# Patient Record
Sex: FEMALE | Race: OTHER | HISPANIC OR LATINO | URBAN - METROPOLITAN AREA
[De-identification: names, ages, dates, MRNs, and addresses within clinical notes are randomized per-mention and may not be internally consistent; named-entity substitution may affect disease eponyms.]

---

## 2024-01-21 ENCOUNTER — OUTPATIENT (OUTPATIENT)
Dept: OUTPATIENT SERVICES | Facility: HOSPITAL | Age: 34
LOS: 1 days | End: 2024-01-21
Payer: COMMERCIAL

## 2024-01-21 VITALS — DIASTOLIC BLOOD PRESSURE: 78 MMHG | SYSTOLIC BLOOD PRESSURE: 139 MMHG | OXYGEN SATURATION: 100 % | HEART RATE: 60 BPM

## 2024-01-21 VITALS — OXYGEN SATURATION: 100 % | HEART RATE: 59 BPM

## 2024-01-21 DIAGNOSIS — O26.899 OTHER SPECIFIED PREGNANCY RELATED CONDITIONS, UNSPECIFIED TRIMESTER: ICD-10-CM

## 2024-01-21 LAB
ALBUMIN SERPL ELPH-MCNC: 3.2 G/DL — LOW (ref 3.3–5)
ALP SERPL-CCNC: 170 U/L — HIGH (ref 40–120)
ALT FLD-CCNC: 12 U/L — SIGNIFICANT CHANGE UP (ref 10–45)
ANION GAP SERPL CALC-SCNC: 12 MMOL/L — SIGNIFICANT CHANGE UP (ref 5–17)
APPEARANCE UR: ABNORMAL
APTT BLD: 25.1 SEC — SIGNIFICANT CHANGE UP (ref 24.5–35.6)
AST SERPL-CCNC: 17 U/L — SIGNIFICANT CHANGE UP (ref 10–40)
BACTERIA # UR AUTO: ABNORMAL /HPF
BASOPHILS # BLD AUTO: 0.02 K/UL — SIGNIFICANT CHANGE UP (ref 0–0.2)
BASOPHILS NFR BLD AUTO: 0.3 % — SIGNIFICANT CHANGE UP (ref 0–2)
BILIRUB SERPL-MCNC: 0.2 MG/DL — SIGNIFICANT CHANGE UP (ref 0.2–1.2)
BILIRUB UR-MCNC: NEGATIVE — SIGNIFICANT CHANGE UP
BLD GP AB SCN SERPL QL: NEGATIVE — SIGNIFICANT CHANGE UP
BUN SERPL-MCNC: 10 MG/DL — SIGNIFICANT CHANGE UP (ref 7–23)
CALCIUM SERPL-MCNC: 9.1 MG/DL — SIGNIFICANT CHANGE UP (ref 8.4–10.5)
CAST: 2 /LPF — SIGNIFICANT CHANGE UP (ref 0–4)
CHLORIDE SERPL-SCNC: 106 MMOL/L — SIGNIFICANT CHANGE UP (ref 96–108)
CO2 SERPL-SCNC: 20 MMOL/L — LOW (ref 22–31)
COLOR SPEC: YELLOW — SIGNIFICANT CHANGE UP
CREAT ?TM UR-MCNC: 112 MG/DL — SIGNIFICANT CHANGE UP
CREAT SERPL-MCNC: 0.55 MG/DL — SIGNIFICANT CHANGE UP (ref 0.5–1.3)
DIFF PNL FLD: NEGATIVE — SIGNIFICANT CHANGE UP
EGFR: 123 ML/MIN/1.73M2 — SIGNIFICANT CHANGE UP
EOSINOPHIL # BLD AUTO: 0.1 K/UL — SIGNIFICANT CHANGE UP (ref 0–0.5)
EOSINOPHIL NFR BLD AUTO: 1.4 % — SIGNIFICANT CHANGE UP (ref 0–6)
FIBRINOGEN PPP-MCNC: 796 MG/DL — HIGH (ref 200–445)
GLUCOSE SERPL-MCNC: 80 MG/DL — SIGNIFICANT CHANGE UP (ref 70–99)
GLUCOSE UR QL: NEGATIVE MG/DL — SIGNIFICANT CHANGE UP
HCT VFR BLD CALC: 34 % — LOW (ref 34.5–45)
HGB BLD-MCNC: 11 G/DL — LOW (ref 11.5–15.5)
IMM GRANULOCYTES NFR BLD AUTO: 0.4 % — SIGNIFICANT CHANGE UP (ref 0–0.9)
INR BLD: 0.96 RATIO — SIGNIFICANT CHANGE UP (ref 0.85–1.18)
KETONES UR-MCNC: NEGATIVE MG/DL — SIGNIFICANT CHANGE UP
LDH SERPL L TO P-CCNC: 132 U/L — SIGNIFICANT CHANGE UP (ref 50–242)
LEUKOCYTE ESTERASE UR-ACNC: ABNORMAL
LYMPHOCYTES # BLD AUTO: 1.84 K/UL — SIGNIFICANT CHANGE UP (ref 1–3.3)
LYMPHOCYTES # BLD AUTO: 25.7 % — SIGNIFICANT CHANGE UP (ref 13–44)
MCHC RBC-ENTMCNC: 28.4 PG — SIGNIFICANT CHANGE UP (ref 27–34)
MCHC RBC-ENTMCNC: 32.4 GM/DL — SIGNIFICANT CHANGE UP (ref 32–36)
MCV RBC AUTO: 87.6 FL — SIGNIFICANT CHANGE UP (ref 80–100)
MONOCYTES # BLD AUTO: 0.35 K/UL — SIGNIFICANT CHANGE UP (ref 0–0.9)
MONOCYTES NFR BLD AUTO: 4.9 % — SIGNIFICANT CHANGE UP (ref 2–14)
NEUTROPHILS # BLD AUTO: 4.82 K/UL — SIGNIFICANT CHANGE UP (ref 1.8–7.4)
NEUTROPHILS NFR BLD AUTO: 67.3 % — SIGNIFICANT CHANGE UP (ref 43–77)
NITRITE UR-MCNC: NEGATIVE — SIGNIFICANT CHANGE UP
NRBC # BLD: 0 /100 WBCS — SIGNIFICANT CHANGE UP (ref 0–0)
PH UR: 6.5 — SIGNIFICANT CHANGE UP (ref 5–8)
PLATELET # BLD AUTO: 182 K/UL — SIGNIFICANT CHANGE UP (ref 150–400)
POTASSIUM SERPL-MCNC: 3.8 MMOL/L — SIGNIFICANT CHANGE UP (ref 3.5–5.3)
POTASSIUM SERPL-SCNC: 3.8 MMOL/L — SIGNIFICANT CHANGE UP (ref 3.5–5.3)
PROT ?TM UR-MCNC: 60 MG/DL — HIGH (ref 0–12)
PROT SERPL-MCNC: 6.5 G/DL — SIGNIFICANT CHANGE UP (ref 6–8.3)
PROT UR-MCNC: 100 MG/DL
PROT/CREAT UR-RTO: 0.5 RATIO — HIGH (ref 0–0.2)
PROTHROM AB SERPL-ACNC: 10.1 SEC — SIGNIFICANT CHANGE UP (ref 9.5–13)
RBC # BLD: 3.88 M/UL — SIGNIFICANT CHANGE UP (ref 3.8–5.2)
RBC # FLD: 15.3 % — HIGH (ref 10.3–14.5)
RBC CASTS # UR COMP ASSIST: 3 /HPF — SIGNIFICANT CHANGE UP (ref 0–4)
REVIEW: SIGNIFICANT CHANGE UP
RH IG SCN BLD-IMP: POSITIVE — SIGNIFICANT CHANGE UP
SODIUM SERPL-SCNC: 138 MMOL/L — SIGNIFICANT CHANGE UP (ref 135–145)
SP GR SPEC: 1.02 — SIGNIFICANT CHANGE UP (ref 1–1.03)
SQUAMOUS # UR AUTO: 6 /HPF — HIGH (ref 0–5)
T PALLIDUM AB TITR SER: NEGATIVE — SIGNIFICANT CHANGE UP
URATE SERPL-MCNC: 4.7 MG/DL — SIGNIFICANT CHANGE UP (ref 2.5–7)
UROBILINOGEN FLD QL: 0.2 MG/DL — SIGNIFICANT CHANGE UP (ref 0.2–1)
WBC # BLD: 7.16 K/UL — SIGNIFICANT CHANGE UP (ref 3.8–10.5)
WBC # FLD AUTO: 7.16 K/UL — SIGNIFICANT CHANGE UP (ref 3.8–10.5)
WBC UR QL: 5 /HPF — SIGNIFICANT CHANGE UP (ref 0–5)

## 2024-01-21 PROCEDURE — 85610 PROTHROMBIN TIME: CPT

## 2024-01-21 PROCEDURE — 85730 THROMBOPLASTIN TIME PARTIAL: CPT

## 2024-01-21 PROCEDURE — G0463: CPT

## 2024-01-21 PROCEDURE — 85025 COMPLETE CBC W/AUTO DIFF WBC: CPT

## 2024-01-21 PROCEDURE — 83986 ASSAY PH BODY FLUID NOS: CPT

## 2024-01-21 PROCEDURE — 83615 LACTATE (LD) (LDH) ENZYME: CPT

## 2024-01-21 PROCEDURE — 80053 COMPREHEN METABOLIC PANEL: CPT

## 2024-01-21 PROCEDURE — 84550 ASSAY OF BLOOD/URIC ACID: CPT

## 2024-01-21 PROCEDURE — 86762 RUBELLA ANTIBODY: CPT

## 2024-01-21 PROCEDURE — 86901 BLOOD TYPING SEROLOGIC RH(D): CPT

## 2024-01-21 PROCEDURE — 81001 URINALYSIS AUTO W/SCOPE: CPT

## 2024-01-21 PROCEDURE — 84156 ASSAY OF PROTEIN URINE: CPT

## 2024-01-21 PROCEDURE — 86780 TREPONEMA PALLIDUM: CPT

## 2024-01-21 PROCEDURE — 85384 FIBRINOGEN ACTIVITY: CPT

## 2024-01-21 PROCEDURE — 82570 ASSAY OF URINE CREATININE: CPT

## 2024-01-21 PROCEDURE — 86900 BLOOD TYPING SEROLOGIC ABO: CPT

## 2024-01-21 PROCEDURE — 59025 FETAL NON-STRESS TEST: CPT

## 2024-01-21 PROCEDURE — 36415 COLL VENOUS BLD VENIPUNCTURE: CPT

## 2024-01-21 PROCEDURE — 86850 RBC ANTIBODY SCREEN: CPT

## 2024-01-21 NOTE — OB PROVIDER TRIAGE NOTE - NSOBPROVIDERNOTE_OBGYN_ALL_OB_FT
Assessment  34yo  at 40w1d presents for leakage of fluid and contractions. Patient found to be intact (membranes not ruptured) and not in labor. Fetal status is reassuring on NST and BPP. However, blood pressures mild range upon presentation. Patient's BP cuff was noted to be around her elbow and was readjusted at approximately 2:55 am. Since then, BPs have been normotensive.  Patient has appointment with her OB tomorrow, . Plan is for IOL if she does not go into labor by 41w.    Plan  - Elevated/labile BPs, possibly related to BP cuff misplacement, but will monitor BPs and draw HELLP labs  - Monitor [place]/NST while in triage    D/w attending Dr. Rogers Jung, PGY2 Assessment  34yo  at 40w1d presents for leakage of fluid and contractions. Patient found to be intact (membranes not ruptured) and not in labor. Fetal status is reassuring on NST and BPP. However, patient with 2 mild-range blood pressures upon presentation. Patient's BP cuff was noted to be around her elbow and was readjusted at approximately 2:55 am. Since then, BPs have been normotensive.  Patient has appointment with her OB tomorrow, . Plan is for IOL if she does not go into labor by 41w.    Plan  - Two mild-range BPs, resolved with BP cuff replacement, will monitor BPs x2 hr and f/u HELLP labs  - CFM/[place]    D/w attending Dr. Rogers Jung, PGY2 Assessment  32yo  at 40w1d presents for leakage of fluid and contractions. Patient found to be intact (membranes not ruptured) and not in labor. Fetal status is reassuring on NST and BPP. However, patient with 2 mild-range blood pressures upon presentation. Patient's BP cuff was noted to be around her elbow and was readjusted at approximately 2:55 am. Since then, BPs have been normotensive.  Patient has appointment with her OB tomorrow, . Plan is for IOL if she does not go into labor by 41w.    Plan  - Two mild-range BPs, resolved with BP cuff replacement, will monitor BPs x2 hr and f/u HELLP labs  - CFM/[place]    D/w attending Dr. Rogers Jung, PGY2      Addendum  @6a  HELLP labs within normal limits (P/C pending). All BPs normotensive since BP cuff readjusted. FHT reactive. [place] with irregular contractions. Patient seen at bedside and reports she feels mild tightening but she can sleep through it - not bothersome. Patient counseled on lab and vital sign results - no diagnosis of hypertensive disorder made. She is cleared for discharge with instructions for close follow-up. Patient to keep appointment for  with OB. Strict return precautions given for severe headahce, RUQ pain, SOB, severe nausea/vomiting. Return precautions for vaginal bleeding, loss of fluid, dec FM, painful/regular CTX. Patient confirms understanding.  - NST reviewed by Buddy PGY-3  Plan d/w Dr. Rogers Chung PGY2 Assessment  32yo  at 40w1d presents for leakage of fluid and contractions. Patient found to be intact (membranes not ruptured) and not in labor. Fetal status is reassuring on NST and BPP. However, patient with 2 mild-range blood pressures upon presentation. Patient's BP cuff was noted to be around her elbow and was readjusted at approximately 2:55 am. Since then, BPs have been normotensive.  Patient has appointment with her OB tomorrow, . Plan is for IOL if she does not go into labor by 41w.    Plan  - Two mild-range BPs, resolved with BP cuff replacement, will monitor BPs x2 hr and f/u HELLP labs  - CFM/[place]    D/w attending Dr. Rogers Jung, PGY2      Addendum  @6a  HELLP labs within normal limits (P/C pending). All BPs normotensive since BP cuff readjusted. FHT reactive. [place] with irregular contractions. Patient seen at bedside and reports she feels mild tightening but she can sleep through it - not bothersome. Patient counseled on lab and vital sign results - no diagnosis of hypertensive disorder made. She is cleared for discharge with instructions for close follow-up. Patient to keep appointment for  with OB. Strict return precautions given for severe headahce, RUQ pain, SOB, severe nausea/vomiting. Return precautions for vaginal bleeding, loss of fluid, dec FM, painful/regular CTX. Patient confirms understanding.  NST reviewed by Buddy PGY-3  Plan d/w Dr. Rogers Chung PGY2 Assessment  32yo  at 40w1d presents for leakage of fluid and contractions. Patient found to be intact (membranes not ruptured) and not in labor. Fetal status is reassuring on NST and BPP. However, patient with 2 mild-range blood pressures upon presentation. Patient's BP cuff was noted to be around her elbow and was readjusted at approximately 2:55 am. Since then, BPs have been normotensive.  Patient has appointment with her OB tomorrow, . Plan is for IOL if she does not go into labor by 41w.    Plan  - Two mild-range BPs, resolved with BP cuff replacement (was not on arm properly and corrected), will monitor BPs x2 hr and f/u HELLP labs  - CFM/[place]    D/w attending Dr. Rogers Jung, PGY2      Addendum  @6a  HELLP labs within normal limits (P/C pending). All BPs normotensive since BP cuff readjusted. No pih sx. FHT reactive. [place] with irregular contractions. Patient seen at bedside and reports she feels mild tightening but she can sleep through it - not bothersome. Patient counseled on lab and vital sign results - no diagnosis of hypertensive disorder made. She is cleared for discharge with instructions for close follow-up. Patient to keep appointment for  with OB. Strict return precautions given for severe headahce, RUQ pain, SOB, severe nausea/vomiting. Return precautions for vaginal bleeding, loss of fluid, dec FM, painful/regular CTX. Patient confirms understanding.  NST reviewed by Buddy PGY-3  Plan d/w Dr. Rogers Chung PGY2    Pt discussed with residents.   Agree with above plan of care.   Has apt tomorrow in office, Knows to call if any concerns prior to this.    Sayra Mccloud, DO

## 2024-01-21 NOTE — OB RN TRIAGE NOTE - SUICIDE SCREENING QUESTION 1
Diabetes is improving with treatment.   Continue current treatment regimen.  Dietary recommendations for ADA diet.  Regular aerobic exercise.  Diabetes will be reassessed in 3 months.  Eye exam   No

## 2024-01-21 NOTE — OB PROVIDER TRIAGE NOTE - HISTORY OF PRESENT ILLNESS
R2 Triage Note    Subjective  HPI: 34yo  at 40w1d presents for leakage of fluid and contractions. Patient reports wetness of her underwear and bedding the evening of  and again last night (). No gushes of fluid or continued leaking between or after the wetness. Reports it was clear fluid. She also reports abdominal "tightness" last night that has continued, now feeling more regular. +FM. -VB. Patient is flu positive, on day 3 of 5 of Tamiflu, currently reporting mild symptoms of cough, congestion.    PNC: Failed 1 hr GCT and 3 hr GTT, performed FS paneling but values were normal and was told she could stop paneling 3 weeks ago. GBS neg per patient.  EFW 8#7 per patient from [facility] on .  OBHx: G1  GynHx: Told she has fibroids in the past (not during this pregnancy), denies cysts, infections, abnormal paps  PMH: denies  PSH: denies  Meds: PNV, Fe   Allergies: NKDA   R2 Triage Note    Subjective  HPI: 34yo  at 40w1d presents for leakage of fluid and contractions. Patient reports wetness of her underwear and bedding the evening of  and again last night (). No gushes of fluid or continued leaking between or after the wetness. Reports it was clear fluid. She also reports abdominal "tightness" last night that has continued, now feeling like more regular uterine contractions, 3/10 in severity. +FM. -VB. Patient is flu positive, on day 3 of 5 of Tamiflu, currently reporting mild symptoms of cough, congestion.    PNC: Failed 1 hr GCT and 3 hr GTT, performed FS paneling but values were normal and was told she could stop paneling 3 weeks ago. GBS neg per patient.  EFW 8#7 per patient from [facility] on .  OBHx: G1  GynHx: Told she has fibroids in the past (not during this pregnancy), denies cysts, infections, abnormal paps  PMH: denies  PSH: denies  Meds: PNV, Fe, Tamiflu (-p)   Allergies: NKDA

## 2024-01-21 NOTE — OB PROVIDER TRIAGE NOTE - NSHPPHYSICALEXAM_GEN_ALL_CORE
Objective  VS  T(C): 37 (01-21-24 @ 02:14)  HR: 52 (01-21-24 @ 03:03)  BP: 114/56 (01-21-24 @ 02:56)  RR: 16 (01-21-24 @ 02:14)  SpO2: 98% (01-21-24 @ 03:03)    PE:   CV: clinically well perfused  Pulm: breathing comfortably on RA  Abd: gravid, nontender  Extr: moving all extremities with ease     Spec: neg pooling, neg nitrazine, neg ferning  VE: 1/60/-3    FHT: baseline 135, mod variability, +accels, -decels; cat 1  [place]: q10min, irregular  Sono: vertex, posterior fundal placenta, BPP 8/8, FARIDEH 13.5 Objective  VS  T(C): 37 (01-21-24 @ 02:14)  HR: 52 (01-21-24 @ 03:03)  BP: 114/56 (01-21-24 @ 02:56)  RR: 16 (01-21-24 @ 02:14)  SpO2: 98% (01-21-24 @ 03:03)    PE:   CV: clinically well perfused  Pulm: breathing comfortably on RA  Abd: gravid, nontender  Extr: moving all extremities with ease     Spec: neg pooling, neg Nitrazine, neg ferning  VE: 1/60/-3    FHT: baseline 135, mod variability, +accels, -decels; cat 1  [place]: q10min, irregular  Sono: vertex, posterior fundal placenta, BPP 8/8, FARIDEH 13.5

## 2024-01-25 ENCOUNTER — INPATIENT (INPATIENT)
Facility: HOSPITAL | Age: 34
LOS: 3 days | Discharge: HOME | End: 2024-01-29
Attending: OBSTETRICS & GYNECOLOGY | Admitting: OBSTETRICS & GYNECOLOGY
Payer: COMMERCIAL

## 2024-01-25 VITALS — TEMPERATURE: 99 F | RESPIRATION RATE: 18 BRPM

## 2024-01-25 DIAGNOSIS — O26.899 OTHER SPECIFIED PREGNANCY RELATED CONDITIONS, UNSPECIFIED TRIMESTER: ICD-10-CM

## 2024-01-25 DIAGNOSIS — Z34.80 ENCOUNTER FOR SUPERVISION OF OTHER NORMAL PREGNANCY, UNSPECIFIED TRIMESTER: ICD-10-CM

## 2024-01-25 LAB
BASOPHILS # BLD AUTO: 0.02 K/UL — SIGNIFICANT CHANGE UP (ref 0–0.2)
BASOPHILS NFR BLD AUTO: 0.3 % — SIGNIFICANT CHANGE UP (ref 0–2)
BLD GP AB SCN SERPL QL: NEGATIVE — SIGNIFICANT CHANGE UP
EOSINOPHIL # BLD AUTO: 0.03 K/UL — SIGNIFICANT CHANGE UP (ref 0–0.5)
EOSINOPHIL NFR BLD AUTO: 0.4 % — SIGNIFICANT CHANGE UP (ref 0–6)
HCT VFR BLD CALC: 34.5 % — SIGNIFICANT CHANGE UP (ref 34.5–45)
HGB BLD-MCNC: 11.5 G/DL — SIGNIFICANT CHANGE UP (ref 11.5–15.5)
IMM GRANULOCYTES NFR BLD AUTO: 0.7 % — SIGNIFICANT CHANGE UP (ref 0–0.9)
LYMPHOCYTES # BLD AUTO: 1.77 K/UL — SIGNIFICANT CHANGE UP (ref 1–3.3)
LYMPHOCYTES # BLD AUTO: 26.5 % — SIGNIFICANT CHANGE UP (ref 13–44)
MCHC RBC-ENTMCNC: 28.7 PG — SIGNIFICANT CHANGE UP (ref 27–34)
MCHC RBC-ENTMCNC: 33.3 GM/DL — SIGNIFICANT CHANGE UP (ref 32–36)
MCV RBC AUTO: 86 FL — SIGNIFICANT CHANGE UP (ref 80–100)
MONOCYTES # BLD AUTO: 0.39 K/UL — SIGNIFICANT CHANGE UP (ref 0–0.9)
MONOCYTES NFR BLD AUTO: 5.8 % — SIGNIFICANT CHANGE UP (ref 2–14)
NEUTROPHILS # BLD AUTO: 4.42 K/UL — SIGNIFICANT CHANGE UP (ref 1.8–7.4)
NEUTROPHILS NFR BLD AUTO: 66.3 % — SIGNIFICANT CHANGE UP (ref 43–77)
NRBC # BLD: 0 /100 WBCS — SIGNIFICANT CHANGE UP (ref 0–0)
PLATELET # BLD AUTO: 218 K/UL — SIGNIFICANT CHANGE UP (ref 150–400)
RBC # BLD: 4.01 M/UL — SIGNIFICANT CHANGE UP (ref 3.8–5.2)
RBC # FLD: 14.9 % — HIGH (ref 10.3–14.5)
RH IG SCN BLD-IMP: POSITIVE — SIGNIFICANT CHANGE UP
WBC # BLD: 6.68 K/UL — SIGNIFICANT CHANGE UP (ref 3.8–10.5)
WBC # FLD AUTO: 6.68 K/UL — SIGNIFICANT CHANGE UP (ref 3.8–10.5)

## 2024-01-25 RX ORDER — CITRIC ACID/SODIUM CITRATE 300-500 MG
15 SOLUTION, ORAL ORAL EVERY 6 HOURS
Refills: 0 | Status: DISCONTINUED | OUTPATIENT
Start: 2024-01-25 | End: 2024-01-26

## 2024-01-25 RX ORDER — SODIUM CHLORIDE 9 MG/ML
1000 INJECTION, SOLUTION INTRAVENOUS
Refills: 0 | Status: DISCONTINUED | OUTPATIENT
Start: 2024-01-25 | End: 2024-01-26

## 2024-01-25 RX ORDER — INFLUENZA VIRUS VACCINE 15; 15; 15; 15 UG/.5ML; UG/.5ML; UG/.5ML; UG/.5ML
0.5 SUSPENSION INTRAMUSCULAR ONCE
Refills: 0 | Status: DISCONTINUED | OUTPATIENT
Start: 2024-01-25 | End: 2024-01-29

## 2024-01-25 RX ORDER — OXYTOCIN 10 UNIT/ML
333.33 VIAL (ML) INJECTION
Qty: 20 | Refills: 0 | Status: DISCONTINUED | OUTPATIENT
Start: 2024-01-25 | End: 2024-01-26

## 2024-01-25 RX ORDER — CHLORHEXIDINE GLUCONATE 213 G/1000ML
1 SOLUTION TOPICAL DAILY
Refills: 0 | Status: DISCONTINUED | OUTPATIENT
Start: 2024-01-25 | End: 2024-01-26

## 2024-01-25 RX ADMIN — SODIUM CHLORIDE 125 MILLILITER(S): 9 INJECTION, SOLUTION INTRAVENOUS at 19:37

## 2024-01-25 NOTE — OB PROVIDER H&P - NSHPPHYSICALEXAM_GEN_ALL_CORE
PE:    T(C): 37.0 (01-25-24 @ 18:35), Max: 37.0 (01-25-24 @ 18:35)  HR: 55 (01-25-24 @ 19:37) (53 - 62)  BP: 119/63 (01-25-24 @ 18:38) (119/63 - 119/63)  RR: 18 (01-25-24 @ 18:35) (18 - 18)  SpO2: 100% (01-25-24 @ 19:37) (99% - 100%)    General: NAD, A&Ox3  CV: RRR  Lungs: Clear bilat   Abd: soft, nontender, gravid  VE: 4/70/-3  Bedside sono: vertex  EFM: 135/moderate variablity/+accels/-decels  TOCO: q2-5mins

## 2024-01-25 NOTE — OB PROVIDER H&P - NSLOWPPHRISK_OBGYN_A_OB
No previous uterine incision/[place] Pregnancy/Less than or equal to 4 previous vaginal births/No known bleeding disorder/No history of postpartum hemorrhage/No other PPH risks indicated

## 2024-01-25 NOTE — OB PROVIDER H&P - NS ATTEND AMEND GEN_ALL_CORE FT
P0 at 40 6/7wks admitted in labor -   consents obtained risks including need for section/transfusion/operative delivery reviewed  cat 1 tracing  Mami Newsome MD

## 2024-01-25 NOTE — OB PROVIDER H&P - HISTORY OF PRESENT ILLNESS
Pt is a 32yo G1 @ 40w5d here from PN office for decels in the office. Pt. denies LOF, VB, painful ctx. +FM. PNC uncomplicated GBS (-) EFW 3700g    OBHx: primip  GYNHx: +remote hx of fibroids (not in this pregnancy); denies ovarian cysts, hx of abnormal pap or STDs  PMHx: denies  PSurgHx: denies  Allergies: flu shot: hives  Meds: PNV, Fe  Social: No smoking, alcohol, or illicit drug use  Psych: No hx of anxiety or depression

## 2024-01-25 NOTE — OB PROVIDER H&P - ASSESSMENT
A/P: 32yo G1 @ 40w5d here in early labor for expectant management at this time  - Admit to L&D  - Routine labs   - EFM/TOCO: resuscitative measures prn  - VE @ 11p: text Jerod  - Anesthesia consult for epidural prn  - Expect     d/w Dr. Jerod Gonzalez, PA-C

## 2024-01-25 NOTE — OB PROVIDER H&P - INTERNATIONAL TRAVEL
Weight management plan: Patient was referred to their PCP to discuss a diet and exercise plan.     BERTRAM Carvalho MA September 27, 2017 10:38 AM     No

## 2024-01-26 LAB
ALBUMIN SERPL ELPH-MCNC: 3.3 G/DL — SIGNIFICANT CHANGE UP (ref 3.3–5)
ALP SERPL-CCNC: 167 U/L — HIGH (ref 40–120)
ALT FLD-CCNC: 11 U/L — SIGNIFICANT CHANGE UP (ref 10–45)
ANION GAP SERPL CALC-SCNC: 12 MMOL/L — SIGNIFICANT CHANGE UP (ref 5–17)
APPEARANCE UR: CLEAR — SIGNIFICANT CHANGE UP
APTT BLD: 25.5 SEC — SIGNIFICANT CHANGE UP (ref 24.5–35.6)
AST SERPL-CCNC: 18 U/L — SIGNIFICANT CHANGE UP (ref 10–40)
BASOPHILS # BLD AUTO: 0.02 K/UL — SIGNIFICANT CHANGE UP (ref 0–0.2)
BASOPHILS NFR BLD AUTO: 0.3 % — SIGNIFICANT CHANGE UP (ref 0–2)
BILIRUB SERPL-MCNC: 0.2 MG/DL — SIGNIFICANT CHANGE UP (ref 0.2–1.2)
BILIRUB UR-MCNC: NEGATIVE — SIGNIFICANT CHANGE UP
BUN SERPL-MCNC: 8 MG/DL — SIGNIFICANT CHANGE UP (ref 7–23)
CALCIUM SERPL-MCNC: 8.8 MG/DL — SIGNIFICANT CHANGE UP (ref 8.4–10.5)
CHLORIDE SERPL-SCNC: 109 MMOL/L — HIGH (ref 96–108)
CO2 SERPL-SCNC: 22 MMOL/L — SIGNIFICANT CHANGE UP (ref 22–31)
COLOR SPEC: YELLOW — SIGNIFICANT CHANGE UP
CREAT ?TM UR-MCNC: 43 MG/DL — SIGNIFICANT CHANGE UP
CREAT SERPL-MCNC: 0.62 MG/DL — SIGNIFICANT CHANGE UP (ref 0.5–1.3)
DIFF PNL FLD: NEGATIVE — SIGNIFICANT CHANGE UP
EGFR: 120 ML/MIN/1.73M2 — SIGNIFICANT CHANGE UP
EOSINOPHIL # BLD AUTO: 0.04 K/UL — SIGNIFICANT CHANGE UP (ref 0–0.5)
EOSINOPHIL NFR BLD AUTO: 0.5 % — SIGNIFICANT CHANGE UP (ref 0–6)
FIBRINOGEN PPP-MCNC: 608 MG/DL — HIGH (ref 200–445)
GLUCOSE SERPL-MCNC: 88 MG/DL — SIGNIFICANT CHANGE UP (ref 70–99)
GLUCOSE UR QL: NEGATIVE MG/DL — SIGNIFICANT CHANGE UP
HCT VFR BLD CALC: 33.4 % — LOW (ref 34.5–45)
HGB BLD-MCNC: 11 G/DL — LOW (ref 11.5–15.5)
IMM GRANULOCYTES NFR BLD AUTO: 0.5 % — SIGNIFICANT CHANGE UP (ref 0–0.9)
INR BLD: 0.9 RATIO — SIGNIFICANT CHANGE UP (ref 0.85–1.18)
KETONES UR-MCNC: NEGATIVE MG/DL — SIGNIFICANT CHANGE UP
LDH SERPL L TO P-CCNC: 130 U/L — SIGNIFICANT CHANGE UP (ref 50–242)
LEUKOCYTE ESTERASE UR-ACNC: NEGATIVE — SIGNIFICANT CHANGE UP
LYMPHOCYTES # BLD AUTO: 1.83 K/UL — SIGNIFICANT CHANGE UP (ref 1–3.3)
LYMPHOCYTES # BLD AUTO: 23.4 % — SIGNIFICANT CHANGE UP (ref 13–44)
MCHC RBC-ENTMCNC: 28.6 PG — SIGNIFICANT CHANGE UP (ref 27–34)
MCHC RBC-ENTMCNC: 32.9 GM/DL — SIGNIFICANT CHANGE UP (ref 32–36)
MCV RBC AUTO: 86.8 FL — SIGNIFICANT CHANGE UP (ref 80–100)
MONOCYTES # BLD AUTO: 0.5 K/UL — SIGNIFICANT CHANGE UP (ref 0–0.9)
MONOCYTES NFR BLD AUTO: 6.4 % — SIGNIFICANT CHANGE UP (ref 2–14)
NEUTROPHILS # BLD AUTO: 5.38 K/UL — SIGNIFICANT CHANGE UP (ref 1.8–7.4)
NEUTROPHILS NFR BLD AUTO: 68.9 % — SIGNIFICANT CHANGE UP (ref 43–77)
NITRITE UR-MCNC: NEGATIVE — SIGNIFICANT CHANGE UP
NRBC # BLD: 0 /100 WBCS — SIGNIFICANT CHANGE UP (ref 0–0)
PH UR: 7 — SIGNIFICANT CHANGE UP (ref 5–8)
PLATELET # BLD AUTO: 201 K/UL — SIGNIFICANT CHANGE UP (ref 150–400)
POTASSIUM SERPL-MCNC: 3.8 MMOL/L — SIGNIFICANT CHANGE UP (ref 3.5–5.3)
POTASSIUM SERPL-SCNC: 3.8 MMOL/L — SIGNIFICANT CHANGE UP (ref 3.5–5.3)
PROT ?TM UR-MCNC: 20 MG/DL — HIGH (ref 0–12)
PROT SERPL-MCNC: 6.3 G/DL — SIGNIFICANT CHANGE UP (ref 6–8.3)
PROT UR-MCNC: SIGNIFICANT CHANGE UP MG/DL
PROT/CREAT UR-RTO: 0.5 RATIO — HIGH (ref 0–0.2)
PROTHROM AB SERPL-ACNC: 9.9 SEC — SIGNIFICANT CHANGE UP (ref 9.5–13)
RBC # BLD: 3.85 M/UL — SIGNIFICANT CHANGE UP (ref 3.8–5.2)
RBC # FLD: 14.7 % — HIGH (ref 10.3–14.5)
RUBV IGG SER-ACNC: 3.4 INDEX — SIGNIFICANT CHANGE UP
RUBV IGG SER-IMP: POSITIVE — SIGNIFICANT CHANGE UP
SODIUM SERPL-SCNC: 143 MMOL/L — SIGNIFICANT CHANGE UP (ref 135–145)
SP GR SPEC: 1.01 — SIGNIFICANT CHANGE UP (ref 1–1.03)
T PALLIDUM AB TITR SER: NEGATIVE — SIGNIFICANT CHANGE UP
URATE SERPL-MCNC: 5 MG/DL — SIGNIFICANT CHANGE UP (ref 2.5–7)
UROBILINOGEN FLD QL: 0.2 MG/DL — SIGNIFICANT CHANGE UP (ref 0.2–1)
WBC # BLD: 7.81 K/UL — SIGNIFICANT CHANGE UP (ref 3.8–10.5)
WBC # FLD AUTO: 7.81 K/UL — SIGNIFICANT CHANGE UP (ref 3.8–10.5)

## 2024-01-26 RX ORDER — ACETAMINOPHEN 500 MG
975 TABLET ORAL
Refills: 0 | Status: DISCONTINUED | OUTPATIENT
Start: 2024-01-26 | End: 2024-01-29

## 2024-01-26 RX ORDER — BENZOCAINE 10 %
1 GEL (GRAM) MUCOUS MEMBRANE EVERY 6 HOURS
Refills: 0 | Status: DISCONTINUED | OUTPATIENT
Start: 2024-01-26 | End: 2024-01-29

## 2024-01-26 RX ORDER — HYDROCORTISONE 1 %
1 OINTMENT (GRAM) TOPICAL EVERY 6 HOURS
Refills: 0 | Status: DISCONTINUED | OUTPATIENT
Start: 2024-01-26 | End: 2024-01-29

## 2024-01-26 RX ORDER — MAGNESIUM HYDROXIDE 400 MG/1
30 TABLET, CHEWABLE ORAL
Refills: 0 | Status: DISCONTINUED | OUTPATIENT
Start: 2024-01-26 | End: 2024-01-29

## 2024-01-26 RX ORDER — OXYTOCIN 10 UNIT/ML
4 VIAL (ML) INJECTION
Qty: 30 | Refills: 0 | Status: DISCONTINUED | OUTPATIENT
Start: 2024-01-26 | End: 2024-01-26

## 2024-01-26 RX ORDER — KETOROLAC TROMETHAMINE 30 MG/ML
30 SYRINGE (ML) INJECTION ONCE
Refills: 0 | Status: DISCONTINUED | OUTPATIENT
Start: 2024-01-26 | End: 2024-01-26

## 2024-01-26 RX ORDER — OXYCODONE HYDROCHLORIDE 5 MG/1
5 TABLET ORAL ONCE
Refills: 0 | Status: DISCONTINUED | OUTPATIENT
Start: 2024-01-26 | End: 2024-01-29

## 2024-01-26 RX ORDER — PRAMOXINE HYDROCHLORIDE 150 MG/15G
1 AEROSOL, FOAM RECTAL EVERY 4 HOURS
Refills: 0 | Status: DISCONTINUED | OUTPATIENT
Start: 2024-01-26 | End: 2024-01-29

## 2024-01-26 RX ORDER — DIBUCAINE 1 %
1 OINTMENT (GRAM) RECTAL EVERY 6 HOURS
Refills: 0 | Status: DISCONTINUED | OUTPATIENT
Start: 2024-01-26 | End: 2024-01-29

## 2024-01-26 RX ORDER — IBUPROFEN 200 MG
600 TABLET ORAL EVERY 6 HOURS
Refills: 0 | Status: COMPLETED | OUTPATIENT
Start: 2024-01-26 | End: 2024-12-24

## 2024-01-26 RX ORDER — AER TRAVELER 0.5 G/1
1 SOLUTION RECTAL; TOPICAL EVERY 4 HOURS
Refills: 0 | Status: DISCONTINUED | OUTPATIENT
Start: 2024-01-26 | End: 2024-01-29

## 2024-01-26 RX ORDER — OXYCODONE HYDROCHLORIDE 5 MG/1
5 TABLET ORAL
Refills: 0 | Status: DISCONTINUED | OUTPATIENT
Start: 2024-01-26 | End: 2024-01-29

## 2024-01-26 RX ORDER — SODIUM CHLORIDE 9 MG/ML
3 INJECTION INTRAMUSCULAR; INTRAVENOUS; SUBCUTANEOUS EVERY 8 HOURS
Refills: 0 | Status: DISCONTINUED | OUTPATIENT
Start: 2024-01-26 | End: 2024-01-29

## 2024-01-26 RX ORDER — IBUPROFEN 200 MG
600 TABLET ORAL EVERY 6 HOURS
Refills: 0 | Status: DISCONTINUED | OUTPATIENT
Start: 2024-01-26 | End: 2024-01-29

## 2024-01-26 RX ORDER — SIMETHICONE 80 MG/1
80 TABLET, CHEWABLE ORAL EVERY 4 HOURS
Refills: 0 | Status: DISCONTINUED | OUTPATIENT
Start: 2024-01-26 | End: 2024-01-29

## 2024-01-26 RX ORDER — OXYTOCIN 10 UNIT/ML
41.67 VIAL (ML) INJECTION
Qty: 20 | Refills: 0 | Status: DISCONTINUED | OUTPATIENT
Start: 2024-01-26 | End: 2024-01-29

## 2024-01-26 RX ORDER — TETANUS TOXOID, REDUCED DIPHTHERIA TOXOID AND ACELLULAR PERTUSSIS VACCINE, ADSORBED 5; 2.5; 8; 8; 2.5 [IU]/.5ML; [IU]/.5ML; UG/.5ML; UG/.5ML; UG/.5ML
0.5 SUSPENSION INTRAMUSCULAR ONCE
Refills: 0 | Status: DISCONTINUED | OUTPATIENT
Start: 2024-01-26 | End: 2024-01-29

## 2024-01-26 RX ORDER — LANOLIN
1 OINTMENT (GRAM) TOPICAL EVERY 6 HOURS
Refills: 0 | Status: DISCONTINUED | OUTPATIENT
Start: 2024-01-26 | End: 2024-01-29

## 2024-01-26 RX ORDER — DIPHENHYDRAMINE HCL 50 MG
25 CAPSULE ORAL EVERY 6 HOURS
Refills: 0 | Status: DISCONTINUED | OUTPATIENT
Start: 2024-01-26 | End: 2024-01-29

## 2024-01-26 RX ADMIN — Medication 4 MILLIUNIT(S)/MIN: at 04:03

## 2024-01-26 RX ADMIN — Medication 125 MILLIUNIT(S)/MIN: at 10:51

## 2024-01-26 RX ADMIN — Medication 1 TABLET(S): at 17:40

## 2024-01-26 RX ADMIN — Medication 975 MILLIGRAM(S): at 14:26

## 2024-01-26 RX ADMIN — Medication 30 MILLIGRAM(S): at 11:44

## 2024-01-26 RX ADMIN — Medication 975 MILLIGRAM(S): at 20:00

## 2024-01-26 RX ADMIN — Medication 600 MILLIGRAM(S): at 17:40

## 2024-01-26 RX ADMIN — SODIUM CHLORIDE 3 MILLILITER(S): 9 INJECTION INTRAMUSCULAR; INTRAVENOUS; SUBCUTANEOUS at 23:15

## 2024-01-26 RX ADMIN — Medication 600 MILLIGRAM(S): at 23:45

## 2024-01-26 RX ADMIN — Medication 975 MILLIGRAM(S): at 20:30

## 2024-01-26 RX ADMIN — Medication 600 MILLIGRAM(S): at 18:10

## 2024-01-26 RX ADMIN — SODIUM CHLORIDE 3 MILLILITER(S): 9 INJECTION INTRAMUSCULAR; INTRAVENOUS; SUBCUTANEOUS at 17:33

## 2024-01-26 RX ADMIN — Medication 975 MILLIGRAM(S): at 14:56

## 2024-01-26 RX ADMIN — Medication 600 MILLIGRAM(S): at 23:15

## 2024-01-26 NOTE — OB RN DELIVERY SUMMARY - NSSELHIDDEN_OBGYN_ALL_OB_FT
[NS_DeliveryAttending1_OBGYN_ALL_OB_FT:[id number]==],[NS_DeliveryAssist1_OBGYN_ALL_OB_FT:[id number]=],[NS_DeliveryRN_OBGYN_ALL_OB_FT:[id number]=]

## 2024-01-26 NOTE — DISCHARGE NOTE OB - CARE PLAN
Principal Discharge DX:	Vaginal delivery  Assessment and plan of treatment:	routine post partum care;  regular diet;  limited physical and sexual activities for 5-6 weeks;  to office in 5-6 weeks;   1

## 2024-01-26 NOTE — OB PROVIDER LABOR PROGRESS NOTE - ASSESSMENT
A/P:  -EFM: resuscitative measures prn  -continue Pitocin  -HELLP labs pending for elevated BPs  -peanut ball in place  -anesthesia reinforcement  -anticipate     
A/P:  -EFM: resuscitative measures prn  -continue expectant management  -anticipate     d/w Dr. Newsome who is en route
34y/o  @40w6d, labor c/b PEC, in stable condition. Making good cervical change     Plan:    - Con't augmentation w/ pit   - Increase pain relief   - Continuous EFM, Onycha  - Con't IVF    Noelle Rubio  PGY-1  
a/p P0 at 40+ weeks - in early labor  reviewed exam prior a 7 was likely due to bulging bag and pressure on the cervix  AROM will help increase frequency  also deciding if she will want pitocin - reviewed benefits/alternatives/risk - would like to see what happens and then likely will agree  cat 1   cont toco and efm  reassuring  Mami Newsome MD

## 2024-01-26 NOTE — OB PROVIDER DELIVERY SUMMARY - NSSELHIDDEN_OBGYN_ALL_OB_FT
[NS_DeliveryAttending1_OBGYN_ALL_OB_FT:[id number]==],[NS_DeliveryAssist1_OBGYN_ALL_OB_FT:[id number]=]

## 2024-01-26 NOTE — DISCHARGE NOTE OB - NS MD DC FALL RISK RISK
For information on Fall & Injury Prevention, visit: [email or web address] OR  [email or web address] OR  https://www.cdc.gov/steadi/patient.html

## 2024-01-26 NOTE — DISCHARGE NOTE OB - CARE PROVIDERS DIRECT ADDRESSES
[email or web address].[email or web address]

## 2024-01-26 NOTE — DISCHARGE NOTE OB - PATIENT PORTAL LINK FT
You can access the FollowMyHealth Patient Portal offered by [facility] by registering at the following website: [email or web address]. By joining [facility]’s FollowMyHealth portal, you will also be able to view your health information using other applications (apps) compatible with our system.

## 2024-01-26 NOTE — DISCHARGE NOTE OB - MATERIALS PROVIDED
[facility] [place] Screening Program/[place]  Immunization Record/Guide to Postpartum Care/[facility] Hearing Screen Program/Shaken Baby Prevention Handout/Birth Certificate Instructions/Discharge Medication Information for Patients and Families Pocket Guide

## 2024-01-26 NOTE — OB PROVIDER DELIVERY SUMMARY - NSPROVIDERDELIVERYNOTE_OBGYN_ALL_OB_FT
Pt became fully dilated and desired to push.   RML cut, spontaneous vaginal delivery of liveborn female.  Head, shoulders and body delivered easily.   Infant was passed to mother.  Cord clamping was delayed 30 seconds 2/2 fetal status. Cord was cut.  Placenta delivered spontaneously intact. Uterine massage was performed and pitocin was given. Rectal cytotec given as well some continued atony.  Fundus was firm. RML repaired with vicryl. Good hemostasis was noted.  No other perineal, cervical, or vaginal lacerations noted.  Count correct x2. Mother and baby resting comfortably.  QBL 350ml    Present at delivery were Joanne Watters PGY-1   Tristan PGY-4 and Dr. Ramires present for shoulder precautions Pt became fully dilated and desired to push.   RML cut, spontaneous vaginal delivery of liveborn female.  Head, shoulders and body delivered easily.   Infant was passed to mother.  Cord clamping was delayed 30 seconds 2/2 fetal status. Cord was cut.  Placenta delivered spontaneously intact. Uterine exploration wnl and massage was performed and pitocin was given. Rectal cytotec given as well some continued atony.  Fundus was firm. ML repaired with vicryl rapide. Good hemostasis was noted.  No other perineal, cervical, or vaginal lacerations noted.  Count correct x2. Mother and baby resting comfortably.  QBL 350ml    Present at delivery were Joanne Watters PGY-1   Tristan PGY-4 and Dr. Ramires present for shoulder precautions-they did not participate in any way

## 2024-01-26 NOTE — OB PROVIDER LABOR PROGRESS NOTE - NS_SUBJECTIVE/OBJECTIVE_OBGYN_ALL_OB_FT
Pt seen for possible SROM; bloody show noted and bag of water intact; pt comfortable with epidural in place
PGY1 Labor & Delivery Progress Note     Pt seen & examined 2/2 increased pressure, discomfort     SVE: 9/90/-2  EFM: 145/mod. variability/+accels/-decels  [place]: q2-4 min    T(C): 37.2 (01-26-24 @ 06:45), Max: 37.2 (01-26-24 @ 06:45)  HR: 67 (01-26-24 @ 07:56) (44 - 92)  BP: 137/62 (01-26-24 @ 07:45) (106/56 - 155/69)  RR: 18 (01-25-24 @ 22:31) (18 - 18)  SpO2: 94% (01-26-24 @ 07:56) (68% - 100%)
Pt seen for rectal pressure; pt requesting anesthesia reinforcement
pt comfortable with epidural = was happy we have expectantly managed her

## 2024-01-26 NOTE — DISCHARGE NOTE OB - HOSPITAL COURSE
The patient had iol at 40 5/7 weeks for decel in office.  she did well in labor and had a normal spontaneous delivery and good post partum course The patient had iol at 40 5/7 weeks for decel in office.  she did well in labor and had a normal spontaneous delivery and good post partum course. Stable for discharge on PPD 2. Pt has preeclampsia not requiring magnesium and no BP meds-will have BP check in office in 1-3 days The patient had iol at 40 5/7 weeks for decel in office.  she did well in labor and had a normal spontaneous delivery and good post partum course. Stable for discharge on PPD 2. Pt has preeclampsia and received magnesium sulfate.  She is being discharged home on procardia.

## 2024-01-26 NOTE — DISCHARGE NOTE OB - CARE PROVIDER_API CALL
Richard Daigle  Obstetrics and Gynecology  [address], [address]  [place] [address]  Phone: [phone]  Fax: [phone]  Follow Up Time:

## 2024-01-26 NOTE — OB RN DELIVERY SUMMARY - NS_SEPSISRSKCALC_OBGYN_ALL_OB_FT
EOS calculated successfully. EOS Risk Factor: 0.5/1000 live births ([facility] national incidence); GA=40w6d; Temp=99.5; ROM=7.433; GBS='Negative'; Antibiotics='No antibiotics or any antibiotics < 2 hrs prior to birth'

## 2024-01-26 NOTE — DISCHARGE NOTE OB - MEDICATION SUMMARY - MEDICATIONS TO TAKE
I will START or STAY ON the medications listed below when I get home from the hospital:    ibuprofen 600 mg oral tablet  -- 1 tab(s) by mouth every 6 hours  -- Indication: For pain    acetaminophen 325 mg oral tablet  -- 3 tab(s) by mouth every 6 hours as needed for  moderate pain  -- Indication: For pain    Prenatal Multivitamins with Folic Acid 1 mg oral tablet  -- 1 tab(s) by mouth once a day  -- Indication: For wellness   I will START or STAY ON the medications listed below when I get home from the hospital:    ibuprofen 600 mg oral tablet  -- 1 tab(s) by mouth every 6 hours  -- Indication: For pain    acetaminophen 325 mg oral tablet  -- 3 tab(s) by mouth every 6 hours as needed for  moderate pain  -- Indication: For pain    NIFEdipine 30 mg oral tablet, extended release  -- 1 tab(s) by mouth once a day  -- Indication: For elevated bp    Prenatal Multivitamins with Folic Acid 1 mg oral tablet  -- 1 tab(s) by mouth once a day  -- Indication: For wellness

## 2024-01-26 NOTE — OB NEONATOLOGY/PEDIATRICIAN DELIVERY SUMMARY - NSPEDSNEONOTESA_OBGYN_ALL_OB_FT
Requested by OB to attend a delivery of 40 6/7 week female infant born via  to a 34yo  mother who is O pos prenatal labs neg/NR/Imm, GBS neg on . Pregnancy uncomplicated. AROM clear at 0322 (8 hours PTD). Peds called for NRFHT with fetal tachycardia and possible shoulder dystocia. Infant delivered cephalic and emerged dusky with decreased tone. Responded to vigorous stimulation with strong cry and improvement in tone and color. Routine resuscitation provided. At ~4:35 minutes of life CPAP started for nasal flaring and dusky color. FiO2 increased to 30% to keep preductal O2 sats within target range. Unable to wean off CPAP and was transferred to NICU for further management. Parents updated prior to transfer. Apgars 8/8. EOS 0.10. Mother wishes to breastfeed exclusively.

## 2024-01-26 NOTE — DISCHARGE NOTE OB - PLAN OF CARE
routine post partum care;  regular diet;  limited physical and sexual activities for 5-6 weeks;  to office in 5-6 weeks;

## 2024-01-27 RX ADMIN — Medication 600 MILLIGRAM(S): at 23:42

## 2024-01-27 RX ADMIN — Medication 975 MILLIGRAM(S): at 09:30

## 2024-01-27 RX ADMIN — Medication 975 MILLIGRAM(S): at 02:32

## 2024-01-27 RX ADMIN — SODIUM CHLORIDE 3 MILLILITER(S): 9 INJECTION INTRAMUSCULAR; INTRAVENOUS; SUBCUTANEOUS at 06:18

## 2024-01-27 RX ADMIN — Medication 975 MILLIGRAM(S): at 14:35

## 2024-01-27 RX ADMIN — Medication 600 MILLIGRAM(S): at 12:30

## 2024-01-27 RX ADMIN — Medication 600 MILLIGRAM(S): at 18:45

## 2024-01-27 RX ADMIN — Medication 975 MILLIGRAM(S): at 21:30

## 2024-01-27 RX ADMIN — Medication 975 MILLIGRAM(S): at 15:15

## 2024-01-27 RX ADMIN — Medication 600 MILLIGRAM(S): at 05:19

## 2024-01-27 RX ADMIN — Medication 975 MILLIGRAM(S): at 20:50

## 2024-01-27 RX ADMIN — Medication 600 MILLIGRAM(S): at 18:02

## 2024-01-27 RX ADMIN — Medication 600 MILLIGRAM(S): at 11:43

## 2024-01-27 RX ADMIN — Medication 600 MILLIGRAM(S): at 05:49

## 2024-01-27 RX ADMIN — Medication 975 MILLIGRAM(S): at 02:02

## 2024-01-27 RX ADMIN — Medication 1 TABLET(S): at 11:43

## 2024-01-27 RX ADMIN — Medication 975 MILLIGRAM(S): at 08:49

## 2024-01-28 LAB
ALBUMIN SERPL ELPH-MCNC: 2.9 G/DL — LOW (ref 3.3–5)
ALP SERPL-CCNC: 125 U/L — HIGH (ref 40–120)
ALT FLD-CCNC: 21 U/L — SIGNIFICANT CHANGE UP (ref 10–45)
ANION GAP SERPL CALC-SCNC: 10 MMOL/L — SIGNIFICANT CHANGE UP (ref 5–17)
APTT BLD: 26.3 SEC — SIGNIFICANT CHANGE UP (ref 24.5–35.6)
AST SERPL-CCNC: 43 U/L — HIGH (ref 10–40)
BASOPHILS # BLD AUTO: 0.03 K/UL — SIGNIFICANT CHANGE UP (ref 0–0.2)
BASOPHILS NFR BLD AUTO: 0.3 % — SIGNIFICANT CHANGE UP (ref 0–2)
BILIRUB SERPL-MCNC: 0.1 MG/DL — LOW (ref 0.2–1.2)
BUN SERPL-MCNC: 7 MG/DL — SIGNIFICANT CHANGE UP (ref 7–23)
CALCIUM SERPL-MCNC: 8.4 MG/DL — SIGNIFICANT CHANGE UP (ref 8.4–10.5)
CHLORIDE SERPL-SCNC: 106 MMOL/L — SIGNIFICANT CHANGE UP (ref 96–108)
CO2 SERPL-SCNC: 24 MMOL/L — SIGNIFICANT CHANGE UP (ref 22–31)
CREAT SERPL-MCNC: 0.65 MG/DL — SIGNIFICANT CHANGE UP (ref 0.5–1.3)
EGFR: 118 ML/MIN/1.73M2 — SIGNIFICANT CHANGE UP
EOSINOPHIL # BLD AUTO: 0.15 K/UL — SIGNIFICANT CHANGE UP (ref 0–0.5)
EOSINOPHIL NFR BLD AUTO: 1.4 % — SIGNIFICANT CHANGE UP (ref 0–6)
FIBRINOGEN PPP-MCNC: 602 MG/DL — HIGH (ref 200–445)
GLUCOSE SERPL-MCNC: 84 MG/DL — SIGNIFICANT CHANGE UP (ref 70–99)
HCT VFR BLD CALC: 29.1 % — LOW (ref 34.5–45)
HGB BLD-MCNC: 9.7 G/DL — LOW (ref 11.5–15.5)
IMM GRANULOCYTES NFR BLD AUTO: 0.5 % — SIGNIFICANT CHANGE UP (ref 0–0.9)
INR BLD: 0.9 RATIO — SIGNIFICANT CHANGE UP (ref 0.85–1.18)
LDH SERPL L TO P-CCNC: 273 U/L — HIGH (ref 50–242)
LYMPHOCYTES # BLD AUTO: 1.41 K/UL — SIGNIFICANT CHANGE UP (ref 1–3.3)
LYMPHOCYTES # BLD AUTO: 13.5 % — SIGNIFICANT CHANGE UP (ref 13–44)
MAGNESIUM SERPL-MCNC: 5 MG/DL — HIGH (ref 1.6–2.6)
MCHC RBC-ENTMCNC: 29 PG — SIGNIFICANT CHANGE UP (ref 27–34)
MCHC RBC-ENTMCNC: 33.3 GM/DL — SIGNIFICANT CHANGE UP (ref 32–36)
MCV RBC AUTO: 87.1 FL — SIGNIFICANT CHANGE UP (ref 80–100)
MONOCYTES # BLD AUTO: 0.36 K/UL — SIGNIFICANT CHANGE UP (ref 0–0.9)
MONOCYTES NFR BLD AUTO: 3.4 % — SIGNIFICANT CHANGE UP (ref 2–14)
NEUTROPHILS # BLD AUTO: 8.46 K/UL — HIGH (ref 1.8–7.4)
NEUTROPHILS NFR BLD AUTO: 80.9 % — HIGH (ref 43–77)
NRBC # BLD: 0 /100 WBCS — SIGNIFICANT CHANGE UP (ref 0–0)
PLATELET # BLD AUTO: 221 K/UL — SIGNIFICANT CHANGE UP (ref 150–400)
POTASSIUM SERPL-MCNC: 4.1 MMOL/L — SIGNIFICANT CHANGE UP (ref 3.5–5.3)
POTASSIUM SERPL-SCNC: 4.1 MMOL/L — SIGNIFICANT CHANGE UP (ref 3.5–5.3)
PROT SERPL-MCNC: 5.8 G/DL — LOW (ref 6–8.3)
PROTHROM AB SERPL-ACNC: 9.5 SEC — SIGNIFICANT CHANGE UP (ref 9.5–13)
RBC # BLD: 3.34 M/UL — LOW (ref 3.8–5.2)
RBC # FLD: 14.9 % — HIGH (ref 10.3–14.5)
SODIUM SERPL-SCNC: 140 MMOL/L — SIGNIFICANT CHANGE UP (ref 135–145)
URATE SERPL-MCNC: 4.9 MG/DL — SIGNIFICANT CHANGE UP (ref 2.5–7)
WBC # BLD: 10.46 K/UL — SIGNIFICANT CHANGE UP (ref 3.8–10.5)
WBC # FLD AUTO: 10.46 K/UL — SIGNIFICANT CHANGE UP (ref 3.8–10.5)

## 2024-01-28 RX ORDER — SODIUM CHLORIDE 9 MG/ML
1000 INJECTION, SOLUTION INTRAVENOUS
Refills: 0 | Status: DISCONTINUED | OUTPATIENT
Start: 2024-01-28 | End: 2024-01-29

## 2024-01-28 RX ORDER — IBUPROFEN 200 MG
1 TABLET ORAL
Qty: 0 | Refills: 0 | DISCHARGE
Start: 2024-01-28

## 2024-01-28 RX ORDER — MAGNESIUM SULFATE 500 MG/ML
2 VIAL (ML) INJECTION
Qty: 40 | Refills: 0 | Status: DISCONTINUED | OUTPATIENT
Start: 2024-01-28 | End: 2024-01-28

## 2024-01-28 RX ORDER — ACETAMINOPHEN 500 MG
3 TABLET ORAL
Qty: 0 | Refills: 0 | DISCHARGE
Start: 2024-01-28

## 2024-01-28 RX ORDER — NIFEDIPINE 30 MG
30 TABLET, EXTENDED RELEASE 24 HR ORAL DAILY
Refills: 0 | Status: DISCONTINUED | OUTPATIENT
Start: 2024-01-28 | End: 2024-01-29

## 2024-01-28 RX ORDER — MAGNESIUM SULFATE 500 MG/ML
2 VIAL (ML) INJECTION
Qty: 40 | Refills: 0 | Status: DISCONTINUED | OUTPATIENT
Start: 2024-01-28 | End: 2024-01-29

## 2024-01-28 RX ORDER — METOCLOPRAMIDE HCL 10 MG
5 TABLET ORAL ONCE
Refills: 0 | Status: COMPLETED | OUTPATIENT
Start: 2024-01-28 | End: 2024-01-28

## 2024-01-28 RX ORDER — NIFEDIPINE 30 MG
10 TABLET, EXTENDED RELEASE 24 HR ORAL ONCE
Refills: 0 | Status: COMPLETED | OUTPATIENT
Start: 2024-01-28 | End: 2024-01-28

## 2024-01-28 RX ORDER — DIPHENHYDRAMINE HCL 50 MG
25 CAPSULE ORAL ONCE
Refills: 0 | Status: COMPLETED | OUTPATIENT
Start: 2024-01-28 | End: 2024-01-28

## 2024-01-28 RX ORDER — MAGNESIUM SULFATE 500 MG/ML
4 VIAL (ML) INJECTION ONCE
Refills: 0 | Status: COMPLETED | OUTPATIENT
Start: 2024-01-28 | End: 2024-01-28

## 2024-01-28 RX ADMIN — Medication 600 MILLIGRAM(S): at 05:40

## 2024-01-28 RX ADMIN — Medication 600 MILLIGRAM(S): at 06:16

## 2024-01-28 RX ADMIN — Medication 5 MILLIGRAM(S): at 20:08

## 2024-01-28 RX ADMIN — SODIUM CHLORIDE 50 MILLILITER(S): 9 INJECTION, SOLUTION INTRAVENOUS at 11:23

## 2024-01-28 RX ADMIN — Medication 600 MILLIGRAM(S): at 12:08

## 2024-01-28 RX ADMIN — Medication 975 MILLIGRAM(S): at 03:05

## 2024-01-28 RX ADMIN — Medication 10 MILLIGRAM(S): at 11:22

## 2024-01-28 RX ADMIN — Medication 975 MILLIGRAM(S): at 15:56

## 2024-01-28 RX ADMIN — Medication 30 MILLIGRAM(S): at 11:55

## 2024-01-28 RX ADMIN — Medication 975 MILLIGRAM(S): at 12:02

## 2024-01-28 RX ADMIN — Medication 600 MILLIGRAM(S): at 00:15

## 2024-01-28 RX ADMIN — Medication 25 MILLIGRAM(S): at 20:08

## 2024-01-28 RX ADMIN — Medication 975 MILLIGRAM(S): at 20:08

## 2024-01-28 RX ADMIN — Medication 50 GM/HR: at 11:54

## 2024-01-28 RX ADMIN — Medication 300 GRAM(S): at 11:05

## 2024-01-28 RX ADMIN — Medication 975 MILLIGRAM(S): at 09:23

## 2024-01-28 RX ADMIN — Medication 975 MILLIGRAM(S): at 03:35

## 2024-01-28 NOTE — PROGRESS NOTE ADULT - ASSESSMENT
32y/o  PPD#2 s/p  c/b PEC. Pt had a blood pressure of 161/77 this morning while she was breastfeeding. She is asymptomatic denies any headaches, blurry vision, SOB, CP, RUQ pain. Pt     #PEC  - one episode of severe blood pressure  - denies severe features   - baseline HELLP labs wnl, P/C 0.5; repeat labs prn   - continue to monitor     #Postpartum state  - s/p rectal cyto  - Continue with po analgesia  - Increase ambulation  - Continue regular diet    Catherine Bradley PGY1 34y/o  PPD#2 s/p  c/b PEC. Pt had a blood pressure of 161/77 this morning while she was breastfeeding. She is asymptomatic denies any headaches, blurry vision, SOB, CP, RUQ pain. Overall, pt doing welll postpartum    #PEC  - one episode of severe blood pressure  - denies severe features   - baseline HELLP labs wnl, P/C 0.5   - Repeat HELLP labs  - continue to monitor BP q1h    #Postpartum state  - s/p rectal cyto  - Continue with po analgesia  - Increase ambulation  - Continue regular diet    Catherine Bradley PGY1

## 2024-01-28 NOTE — PROGRESS NOTE ADULT - ATTENDING COMMENTS
Pt seen on am rounds and note reviewed    Pt feels well, no HA, [place], RUQ/epigastric pain  VSS AF  Fundus firm U-2  Ext: no edema  Labs: P/C .5  A/P: PPD 1 s/p -preeclampsia  normal BPs-no meds and no Magnesium  Monitor BP  Likely D/[place] AM   Prerna Hart MD
Pt seen on am rounds  She reports a headache yesterday and some visual spots but didn't notify anyone  1 severe range BP today and another on admission  Fundus firm U-2  ext: 1+ edema  A/P: PPD 2 s/p  with preeclampsia and now severe features by symptoms and BP-  no headache currently  Will start magnesium for 24 hours  might need to add BP med pending BPs today  Prerna Hart MD

## 2024-01-28 NOTE — PROVIDER CONTACT NOTE (OTHER) - ACTION/TREATMENT ORDERED:
OB resident assessed patient at bedside and will speak with attending this morning for further orders.
procardia 10IR repeat b/p in 20 minutes

## 2024-01-28 NOTE — PROVIDER CONTACT NOTE (OTHER) - ASSESSMENT
BP's this /78, 161/77, & 149/80. patient asymptomatic at this time with no complaints of h/a or vision changes.

## 2024-01-28 NOTE — PROVIDER CONTACT NOTE (OTHER) - SITUATION
Day 2 from 1048 experiencing elevated BP's this AM. (see VS flowsheet)
pt noteds to have blood pressure @ 1105-181/77 repeat @1115-172/81. pt asymptomatic

## 2024-01-29 VITALS
RESPIRATION RATE: 18 BRPM | HEART RATE: 91 BPM | DIASTOLIC BLOOD PRESSURE: 70 MMHG | TEMPERATURE: 98 F | OXYGEN SATURATION: 98 % | SYSTOLIC BLOOD PRESSURE: 130 MMHG

## 2024-01-29 LAB
ALBUMIN SERPL ELPH-MCNC: 3.3 G/DL — SIGNIFICANT CHANGE UP (ref 3.3–5)
ALP SERPL-CCNC: 142 U/L — HIGH (ref 40–120)
ALT FLD-CCNC: 30 U/L — SIGNIFICANT CHANGE UP (ref 10–45)
ANION GAP SERPL CALC-SCNC: 12 MMOL/L — SIGNIFICANT CHANGE UP (ref 5–17)
APTT BLD: 26.3 SEC — SIGNIFICANT CHANGE UP (ref 24.5–35.6)
AST SERPL-CCNC: 55 U/L — HIGH (ref 10–40)
BASOPHILS # BLD AUTO: 0.02 K/UL — SIGNIFICANT CHANGE UP (ref 0–0.2)
BASOPHILS NFR BLD AUTO: 0.2 % — SIGNIFICANT CHANGE UP (ref 0–2)
BILIRUB SERPL-MCNC: 0.2 MG/DL — SIGNIFICANT CHANGE UP (ref 0.2–1.2)
BUN SERPL-MCNC: 9 MG/DL — SIGNIFICANT CHANGE UP (ref 7–23)
CALCIUM SERPL-MCNC: 7.5 MG/DL — LOW (ref 8.4–10.5)
CHLORIDE SERPL-SCNC: 101 MMOL/L — SIGNIFICANT CHANGE UP (ref 96–108)
CO2 SERPL-SCNC: 24 MMOL/L — SIGNIFICANT CHANGE UP (ref 22–31)
CREAT SERPL-MCNC: 0.53 MG/DL — SIGNIFICANT CHANGE UP (ref 0.5–1.3)
EGFR: 124 ML/MIN/1.73M2 — SIGNIFICANT CHANGE UP
EOSINOPHIL # BLD AUTO: 0.19 K/UL — SIGNIFICANT CHANGE UP (ref 0–0.5)
EOSINOPHIL NFR BLD AUTO: 1.9 % — SIGNIFICANT CHANGE UP (ref 0–6)
FIBRINOGEN PPP-MCNC: 656 MG/DL — HIGH (ref 200–445)
GLUCOSE SERPL-MCNC: 102 MG/DL — HIGH (ref 70–99)
HCT VFR BLD CALC: 32.8 % — LOW (ref 34.5–45)
HGB BLD-MCNC: 10.6 G/DL — LOW (ref 11.5–15.5)
IMM GRANULOCYTES NFR BLD AUTO: 0.4 % — SIGNIFICANT CHANGE UP (ref 0–0.9)
INR BLD: 0.85 RATIO — SIGNIFICANT CHANGE UP (ref 0.85–1.18)
LDH SERPL L TO P-CCNC: 283 U/L — HIGH (ref 50–242)
LYMPHOCYTES # BLD AUTO: 1.42 K/UL — SIGNIFICANT CHANGE UP (ref 1–3.3)
LYMPHOCYTES # BLD AUTO: 14 % — SIGNIFICANT CHANGE UP (ref 13–44)
MAGNESIUM SERPL-MCNC: 5.6 MG/DL — HIGH (ref 1.6–2.6)
MAGNESIUM SERPL-MCNC: 6.4 MG/DL — HIGH (ref 1.6–2.6)
MCHC RBC-ENTMCNC: 28.1 PG — SIGNIFICANT CHANGE UP (ref 27–34)
MCHC RBC-ENTMCNC: 32.3 GM/DL — SIGNIFICANT CHANGE UP (ref 32–36)
MCV RBC AUTO: 87 FL — SIGNIFICANT CHANGE UP (ref 80–100)
MONOCYTES # BLD AUTO: 0.44 K/UL — SIGNIFICANT CHANGE UP (ref 0–0.9)
MONOCYTES NFR BLD AUTO: 4.4 % — SIGNIFICANT CHANGE UP (ref 2–14)
NEUTROPHILS # BLD AUTO: 8 K/UL — HIGH (ref 1.8–7.4)
NEUTROPHILS NFR BLD AUTO: 79.1 % — HIGH (ref 43–77)
NRBC # BLD: 0 /100 WBCS — SIGNIFICANT CHANGE UP (ref 0–0)
PLATELET # BLD AUTO: 334 K/UL — SIGNIFICANT CHANGE UP (ref 150–400)
POTASSIUM SERPL-MCNC: 3.8 MMOL/L — SIGNIFICANT CHANGE UP (ref 3.5–5.3)
POTASSIUM SERPL-SCNC: 3.8 MMOL/L — SIGNIFICANT CHANGE UP (ref 3.5–5.3)
PROT SERPL-MCNC: 6.7 G/DL — SIGNIFICANT CHANGE UP (ref 6–8.3)
PROTHROM AB SERPL-ACNC: 9 SEC — LOW (ref 9.5–13)
RBC # BLD: 3.77 M/UL — LOW (ref 3.8–5.2)
RBC # FLD: 14.9 % — HIGH (ref 10.3–14.5)
SODIUM SERPL-SCNC: 137 MMOL/L — SIGNIFICANT CHANGE UP (ref 135–145)
URATE SERPL-MCNC: 4.3 MG/DL — SIGNIFICANT CHANGE UP (ref 2.5–7)
WBC # BLD: 10.11 K/UL — SIGNIFICANT CHANGE UP (ref 3.8–10.5)
WBC # FLD AUTO: 10.11 K/UL — SIGNIFICANT CHANGE UP (ref 3.8–10.5)

## 2024-01-29 PROCEDURE — 86850 RBC ANTIBODY SCREEN: CPT

## 2024-01-29 PROCEDURE — 59050 FETAL MONITOR W/REPORT: CPT

## 2024-01-29 PROCEDURE — 81003 URINALYSIS AUTO W/O SCOPE: CPT

## 2024-01-29 PROCEDURE — 85610 PROTHROMBIN TIME: CPT

## 2024-01-29 PROCEDURE — 86900 BLOOD TYPING SEROLOGIC ABO: CPT

## 2024-01-29 PROCEDURE — 84550 ASSAY OF BLOOD/URIC ACID: CPT

## 2024-01-29 PROCEDURE — 85730 THROMBOPLASTIN TIME PARTIAL: CPT

## 2024-01-29 PROCEDURE — 86780 TREPONEMA PALLIDUM: CPT

## 2024-01-29 PROCEDURE — 83735 ASSAY OF MAGNESIUM: CPT

## 2024-01-29 PROCEDURE — 80053 COMPREHEN METABOLIC PANEL: CPT

## 2024-01-29 PROCEDURE — 84156 ASSAY OF PROTEIN URINE: CPT

## 2024-01-29 PROCEDURE — 86901 BLOOD TYPING SEROLOGIC RH(D): CPT

## 2024-01-29 PROCEDURE — 85025 COMPLETE CBC W/AUTO DIFF WBC: CPT

## 2024-01-29 PROCEDURE — 82570 ASSAY OF URINE CREATININE: CPT

## 2024-01-29 PROCEDURE — 85384 FIBRINOGEN ACTIVITY: CPT

## 2024-01-29 PROCEDURE — 83615 LACTATE (LD) (LDH) ENZYME: CPT

## 2024-01-29 RX ORDER — NIFEDIPINE 30 MG
1 TABLET, EXTENDED RELEASE 24 HR ORAL
Qty: 30 | Refills: 0
Start: 2024-01-29

## 2024-01-29 RX ADMIN — Medication 30 MILLIGRAM(S): at 10:59

## 2024-01-29 RX ADMIN — Medication 975 MILLIGRAM(S): at 11:45

## 2024-01-29 NOTE — CHART NOTE - NSCHARTNOTEFT_GEN_A_CORE
Called by RN that patient appears drowsy and has had pressure behind her eyes since starting magnesium. Patient seen and examined at bedside. She reports that benadryl and reglan improved her headache/eye pressure which has now returned after waking up from sleep. She just took Motrin. Patient denies changes in vision, CP, SOB, N/V or other complaints. Patient feels drowsy but denies being lightheaded or dizzy. Mg level therapeutic and wnl at 5.0    T(C): 36.4 (24 @ 00:00), Max: 36.8 (24 @ 10:50)  HR: 70 (24 @ 00:00) (49 - 108)  BP: 121/79 (24 @ 00:00) (111/62 - 181/77)  RR: 18 (24 @ 00:00) (18 - 18)  SpO2: 98% (24 @ 00:00) (96% - 100%)    Gen: NAD, alert and oriented  CV: RRR  Pulm: CTAB  Abd: soft, non-tender  Neuro: 2+ brachioradialis reflex     PPD#2  c/b sPEC on magnesium. Patient experiencing known side effects of magnesium but not exhibiting signs or symptoms of toxicity. Mg wnl at 5.0    - c/w Magnesium for 24hrs postpartum  - c/w Mg level q6hr  - Continue to monitor for signs or symptoms of magnesium toxicity    NASREEN Cabezas, PGY3

## 2024-01-29 NOTE — PROGRESS NOTE ADULT - ASSESSMENT
34y/o  PPD#3 s/p  c/b severe PEC currently on Mg which coincided with her headache/eye pressure. Pain improved with Benadryl/Reglan. Patient otherwise asymptomatic and doing well.    #severe PEC  - 120-130/70-80s overnight, ctm BP  - Mg x24hrs  - Mg therapeutic at 5.0, f/u serum Mg q6hr  - baseline HELLP labs wnl, P/C 0.5   - Repeat HELLP labs  - No signs or symptoms of severe features     #Postpartum state  - s/p rectal cyto  - Continue with po analgesia  - Increase ambulation  - Continue regular diet    NASREEN Cabezas, PGY3

## 2024-01-29 NOTE — PROGRESS NOTE ADULT - SUBJECTIVE AND OBJECTIVE BOX
PPD#3- ATTENDING NOTE    S: Patient doing well. Minimal lochia. Pain controlled.    O: Vital Signs Last 24 Hrs  T(C): 36.6 (2024 08:00), Max: 36.8 (2024 10:50)  T(F): 97.9 (2024 08:00), Max: 98.2 (2024 10:50)  HR: 78 (2024 08:00) (49 - 108)  BP: 130/86 (2024 08:00) (111/62 - 181/77)  BP(mean): 84 (2024 12:45) (80 - 111)  RR: 18 (2024 08:00) (18 - 18)  SpO2: 97% (2024 08:00) (96% - 100%)    Parameters below as of 2024 08:00  Patient On (Oxygen Delivery Method): room air        Gen: NAD  Abd: soft, NT, ND, fundus firm below umbilicus  Lochia: moderate  Ext: no tenderness, no hyper reflexia  Voiding well    Labs:     Antibody Screen                      10.6   10.11 )-----------( 334      ( 2024 06:29 )             32.8       A: 33y PPD#3 s/p  doing well.  magnesium to be d/c later this am  bp is stable on procardia    Plan:  Analgesia prn  Regular diet  Discharge instruction given  F/U 1 weeks  to monitor bp at home and call with elevations.  cont procardia at home      Office [phone]  Dr. aDigle    
32y/o  PPD#2 S/P    Patient seen and examined at bedside, no acute overnight events. No acute complaints, pain well controlled. Patient is ambulating, voiding spontaneously, passing gas, and tolerating regular diet. Denies CP, SOB, N/V, HA, blurred vision, epigastric pain.    Vital Signs Last 24 Hours  T(C): 36.5 (24 @ 05:31), Max: 36.6 (24 @ 17:50)  HR: 60 (24 @ 06:45) (53 - 67)  BP: 149/84 (24 @ 06:45) (112/70 - 161/77)  RR: 18 (24 @ 05:31) (17 - 18)  SpO2: 98% (24 @ 05:31) (95% - 98%)    Physical Exam:  General: NAD  Abdomen: Soft, non-tender, non-distended, fundus firm  Pelvic: Lochia wnl    Labs:    Blood Type: O Positive  Antibody Screen: Negative  RPR: Negative               11.0   7.81  )-----------( 201      (  @ 05:02 )             33.4                11.5   6.68  )-----------( 218      (  @ 19:45 )             34.5                11.0   7.16  )-----------( 182      (  @ 05:12 )             34.0         MEDICATIONS  (STANDING):  acetaminophen     Tablet .. 975 milliGRAM(s) Oral <User Schedule>  diphtheria/tetanus/pertussis (acellular) Vaccine (Adacel) 0.5 milliLiter(s) IntraMuscular once  ibuprofen  Tablet. 600 milliGRAM(s) Oral every 6 hours  influenza   Vaccine 0.5 milliLiter(s) IntraMuscular once  oxytocin Infusion 41.667 milliUNIT(s)/Min (125 mL/Hr) IV Continuous <Continuous>  prenatal multivitamin 1 Tablet(s) Oral daily  sodium chloride 0.9% lock flush 3 milliLiter(s) IV Push every 8 hours    MEDICATIONS  (PRN):  benzocaine 20%/menthol 0.5% Spray 1 Spray(s) Topical every 6 hours PRN for Perineal discomfort  dibucaine 1% Ointment 1 Application(s) Topical every 6 hours PRN Perineal discomfort  diphenhydrAMINE 25 milliGRAM(s) Oral every 6 hours PRN Pruritus  hydrocortisone 1% Cream 1 Application(s) Topical every 6 hours PRN Moderate Pain (4-6)  lanolin Ointment 1 Application(s) Topical every 6 hours PRN nipple soreness  magnesium hydroxide Suspension 30 milliLiter(s) Oral two times a day PRN Constipation  oxyCODONE    IR 5 milliGRAM(s) Oral every 3 hours PRN Moderate to Severe Pain (4-10)  oxyCODONE    IR 5 milliGRAM(s) Oral once PRN Moderate to Severe Pain (4-10)  pramoxine 1%/zinc 5% Cream 1 Application(s) Topical every 4 hours PRN Moderate Pain (4-6)  simethicone 80 milliGRAM(s) Chew every 4 hours PRN Gas  witch hazel Pads 1 Application(s) Topical every 4 hours PRN Perineal discomfort      
OB Progress Note:  PPD#3    S: 32yo PPD#3 s/p . Patient feels well. Overnight patient had a headache/pressure behind her eyes that started with magnesium infusion. Pain improved with Benadryl and Reglan. She is tolerating a regular diet and passing flatus. She is voiding spontaneously, and ambulating without difficulty. Denies CP/SOB. Denies HA/lightheadedness/dizziness. Denies N/V. Denies calf pain    O:  Vitals:   Vital Signs Last 24 Hrs  T(C): 36.4 (2024 00:00), Max: 36.8 (2024 10:50)  T(F): 97.6 (2024 00:00), Max: 98.2 (2024 10:50)  HR: 80 (2024 04:00) (49 - 108)  BP: 127/85 (2024 04:00) (111/62 - 181/77)  BP(mean): 84 (2024 12:45) (80 - 111)  RR: 18 (2024 04:00) (18 - 18)  SpO2: 97% (2024 04:00) (96% - 100%)    Parameters below as of 2024 04:00  Patient On (Oxygen Delivery Method): room air        MEDICATIONS  (STANDING):  acetaminophen     Tablet .. 975 milliGRAM(s) Oral <User Schedule>  diphtheria/tetanus/pertussis (acellular) Vaccine (Adacel) 0.5 milliLiter(s) IntraMuscular once  ibuprofen  Tablet. 600 milliGRAM(s) Oral every 6 hours  influenza   Vaccine 0.5 milliLiter(s) IntraMuscular once  lactated ringers. 1000 milliLiter(s) (50 mL/Hr) IV Continuous <Continuous>  magnesium sulfate Infusion 2 Gm/Hr (50 mL/Hr) IV Continuous <Continuous>  NIFEdipine XL 30 milliGRAM(s) Oral daily  oxytocin Infusion 41.667 milliUNIT(s)/Min (125 mL/Hr) IV Continuous <Continuous>  prenatal multivitamin 1 Tablet(s) Oral daily  sodium chloride 0.9% lock flush 3 milliLiter(s) IV Push every 8 hours    MEDICATIONS  (PRN):  benzocaine 20%/menthol 0.5% Spray 1 Spray(s) Topical every 6 hours PRN for Perineal discomfort  dibucaine 1% Ointment 1 Application(s) Topical every 6 hours PRN Perineal discomfort  diphenhydrAMINE 25 milliGRAM(s) Oral every 6 hours PRN Pruritus  hydrocortisone 1% Cream 1 Application(s) Topical every 6 hours PRN Moderate Pain (4-6)  lanolin Ointment 1 Application(s) Topical every 6 hours PRN nipple soreness  magnesium hydroxide Suspension 30 milliLiter(s) Oral two times a day PRN Constipation  oxyCODONE    IR 5 milliGRAM(s) Oral once PRN Moderate to Severe Pain (4-10)  oxyCODONE    IR 5 milliGRAM(s) Oral every 3 hours PRN Moderate to Severe Pain (4-10)  pramoxine 1%/zinc 5% Cream 1 Application(s) Topical every 4 hours PRN Moderate Pain (4-6)  simethicone 80 milliGRAM(s) Chew every 4 hours PRN Gas  witch hazel Pads 1 Application(s) Topical every 4 hours PRN Perineal discomfort      Labs:  Blood type: O Positive  Rubella IgG: RPR: Negative                          9.7<L>   10.46 >-----------< 221    (  @ 09:43 )             29.1<L>                        11.0<L>   7.81 >-----------< 201    (  @ 05:02 )             33.4<L>    24 @ 09:43      140  |  106  |  7   ----------------------------<  84  4.1   |  24  |  0.65    24 @ 05:02      143  |  109<H>  |  8   ----------------------------<  88  3.8   |  22  |  0.62        Ca    8.4      2024 09:43  Ca    8.8      2024 05:02  Mg     5.6<H>       Mg     5.0<H>         TPro  5.8<L>  /  Alb  2.9<L>  /  TBili  0.1<L>  /  DBili  x   /  AST  43<H>  /  ALT  21  /  AlkPhos  125<H>  24 @ 09:43  TPro  6.3  /  Alb  3.3  /  TBili  0.2  /  DBili  x   /  AST  18  /  ALT  11  /  AlkPhos  167<H>  24 @ 05:02          Physical Exam:  General: NAD  CV: RRR  Pulm: CTAB  Abdomen: soft, non-tender, non-distended, fundus firm  Vaginal: lochia wnl, exam deferred  Extremities: No erythema/calf tenderness
R1 Progress Note    Patient seen and examined at bedside, no acute overnight events. No acute complaints, pain well controlled. Patient is ambulating, voiding spontaneously, passing gas, and tolerating regular diet. Denies CP, SOB, N/V, HA, blurred vision. Bleeding minimal.    Vital Signs Last 24 Hours  T(C): 36.5 (01-27-24 @ 00:45), Max: 37.5 (01-26-24 @ 09:45)  HR: 51 (01-27-24 @ 00:45) (48 - 137)  BP: 121/58 (01-27-24 @ 00:45) (113/72 - 160/72)  RR: 18 (01-27-24 @ 00:45) (16 - 20)  SpO2: 97% (01-27-24 @ 00:45) (68% - 100%)    Physical Exam:  General: NAD  Abdomen: Soft, non-tender, non-distended, fundus firm  Pelvic: Lochia wnl    Labs:    Blood Type: O Positive  Antibody Screen: Negative  RPR: Negative               11.0   7.81  )-----------( 201      ( 01-26 @ 05:02 )             33.4                11.5   6.68  )-----------( 218      ( 01-25 @ 19:45 )             34.5                11.0   7.16  )-----------( 182      ( 01-21 @ 05:12 )             34.0         MEDICATIONS  (STANDING):  acetaminophen     Tablet .. 975 milliGRAM(s) Oral <User Schedule>  diphtheria/tetanus/pertussis (acellular) Vaccine (Adacel) 0.5 milliLiter(s) IntraMuscular once  ibuprofen  Tablet. 600 milliGRAM(s) Oral every 6 hours  influenza   Vaccine 0.5 milliLiter(s) IntraMuscular once  oxytocin Infusion 41.667 milliUNIT(s)/Min (125 mL/Hr) IV Continuous <Continuous>  prenatal multivitamin 1 Tablet(s) Oral daily  sodium chloride 0.9% lock flush 3 milliLiter(s) IV Push every 8 hours    MEDICATIONS  (PRN):  benzocaine 20%/menthol 0.5% Spray 1 Spray(s) Topical every 6 hours PRN for Perineal discomfort  dibucaine 1% Ointment 1 Application(s) Topical every 6 hours PRN Perineal discomfort  diphenhydrAMINE 25 milliGRAM(s) Oral every 6 hours PRN Pruritus  hydrocortisone 1% Cream 1 Application(s) Topical every 6 hours PRN Moderate Pain (4-6)  lanolin Ointment 1 Application(s) Topical every 6 hours PRN nipple soreness  magnesium hydroxide Suspension 30 milliLiter(s) Oral two times a day PRN Constipation  oxyCODONE    IR 5 milliGRAM(s) Oral every 3 hours PRN Moderate to Severe Pain (4-10)  oxyCODONE    IR 5 milliGRAM(s) Oral once PRN Moderate to Severe Pain (4-10)  pramoxine 1%/zinc 5% Cream 1 Application(s) Topical every 4 hours PRN Moderate Pain (4-6)  simethicone 80 milliGRAM(s) Chew every 4 hours PRN Gas  witch hazel Pads 1 Application(s) Topical every 4 hours PRN Perineal discomfort

## 2024-01-30 DIAGNOSIS — J10.1 INFLUENZA DUE TO OTHER IDENTIFIED INFLUENZA VIRUS WITH OTHER RESPIRATORY MANIFESTATIONS: ICD-10-CM

## 2024-01-30 DIAGNOSIS — Z3A.40 40 WEEKS GESTATION OF PREGNANCY: ICD-10-CM

## 2024-01-30 DIAGNOSIS — O48.0 POST-TERM PREGNANCY: ICD-10-CM

## 2024-01-30 DIAGNOSIS — O47.1 FALSE LABOR AT OR AFTER 37 COMPLETED WEEKS OF GESTATION: ICD-10-CM

## 2024-01-30 DIAGNOSIS — O99.513 DISEASES OF THE RESPIRATORY SYSTEM COMPLICATING PREGNANCY, THIRD TRIMESTER: ICD-10-CM

## 2024-01-30 DIAGNOSIS — Z03.71 ENCOUNTER FOR SUSPECTED PROBLEM WITH AMNIOTIC CAVITY AND MEMBRANE RULED OUT: ICD-10-CM

## 2024-08-22 NOTE — PROGRESS NOTE ADULT - ASSESSMENT
"Physical Therapy  Physical Therapy Treatment/Discharge summary    Patient Name: Spike Sumner  MRN: [id number]  Today's Date: 8/22/2024  Time Calculation  Start Time: 0756  Stop Time: 0819  Time Calculation (min): 23 min    Insurance:  Visit number: 8 of 90    Authorization info: no auth  Insurance Type: [place]    General   Reason for visit: Back pain  Referred by: Dr. Leeroy Christiansen     Current Problem  1. Acute midline low back pain without sciatica  Follow Up In Physical Therapy        Subjective:   Patient reports consistent improvement in his back pain since beginning PT; \"doing the exercises tends to keep the pain away. I think sometimes my office chair might be aggravating my pain, but otherwise it is getting better.\" 0/10 pain reported upon arrival. The sharp pain that he had when beginning PT is now resolved.    % of Prior Level of Function (PLOF): 90-95% with heavy activity, 100% with ADLs    HEP Performed:  Yes    Objective:   ROM     Lumbar AROM (%)     Flexion: WNL  Extension: 25% limitation   (L) Side Bend: WNL  (R) Side Bend: WNL  (L) Rotation: WNL  (R) Rotation: WNL                                   Strength Testing     Core/Abdominals: 4/5     Hip                          (R)                    (L)  Flexion:            5/5                 5/5                               Extension:        5-/5                 5-/5                   Abduction:       4+/5                 4+/5                   Adduction:       4+/5                 4+/5                   ER:                    4+/5                   4+/5                     IR:                     4/5                   4/5                           Orthotics: n/a        Gait: heel to toe gait under no visible distress         Flexibility:  R hamstring: WNL  L hamstring: WNL  R piriformis: mild limitation- no pain   L: piriformis: WNL-  no pain                                 Special Tests  90-90 SLR Test: -  Tyrone Test: +  Slump test: -     Radicular " 32y/o  PPD#1 from  c/b PEC. Overall, patient stable and recovering well postpartum.    #PEC  - BPs overnight wnl  - denies severe features   - baseline HELLP labs wnl, P/C 0.5; repeat labs prn   - no antihypertensives on board  - continue to monitor     #Postpartum state  - s/p rectal cyto  - Continue with po analgesia  - Increase ambulation  - Continue regular diet  - IV lock  - No labs    Earnestine Reveles  PGY-1 Symptoms: none        Outcome Measures:  Other Measures  Oswestry Disablity Index (MICHAEL):   Eval: 24%   DC: 8%         Treatments:   -  recumbent stepper x 6'  - recheck  - objective measures  - reviewed HEP      Charges: TE x2   Access Code: [email or web address]    Assessment:   Patient demonstrates improvements in LE strength, flexibility, pain, and overall function, therefore is appropriate for discharge to [facility]. Patient demonstrates excellent independence with his HEP at this time. Patient is in agreement with plan.       Goals:  Start:  07/15/24    Expected End:  10/13/24         In 4 weeks, patient will improve piriformis flexibility to minimal limitation.- GOAL MET   In 4 weeks, patient will improve pain rating to <4/10 in order to perform ADLs without limitation.- GOAL MET   In 4 weeks, patient will report decreased stiffness and pain with lumbar rotation and side bending. - GOAL MET  In 4 weeks, patient will demonstrate improved LE strength by 1 grade in order to lift items with appropriate body mechanics. - GOAL MET   In 4 weeks, patient will improve Modified MICHAEL score by 6.- GOAL MET   In 4 weeks, patient will be independent with a final HEP. - GOAL MET      Plan:   Discharge to [facility].       Leigh Ann Karimi, PT   32y/o  PPD#1 from  c/b PEC. Overall, patient stable and recovering well postpartum.    #PEC  - BPs overnight wnl  - denies severe features   - baseline HELLP labs wnl, P/C 0.5; repeat labs prn   - no antihypertensives on board  - continue to monitor     #Postpartum state  - s/p rectal cyto  - Continue with po analgesia  - Increase ambulation  - Continue regular diet  - IV lock    Earnestine Reveles  PGY-1

## 2024-12-31 ENCOUNTER — NON-APPOINTMENT (OUTPATIENT)
Age: 34
End: 2024-12-31

## 2024-12-31 ENCOUNTER — EMERGENCY (EMERGENCY)
Facility: HOSPITAL | Age: 34
LOS: 1 days | Discharge: HOME | End: 2024-12-31
Attending: STUDENT IN AN ORGANIZED HEALTH CARE EDUCATION/TRAINING PROGRAM | Admitting: STUDENT IN AN ORGANIZED HEALTH CARE EDUCATION/TRAINING PROGRAM
Payer: COMMERCIAL

## 2024-12-31 VITALS
OXYGEN SATURATION: 98 % | HEART RATE: 78 BPM | RESPIRATION RATE: 13 BRPM | DIASTOLIC BLOOD PRESSURE: 74 MMHG | TEMPERATURE: 98 F | SYSTOLIC BLOOD PRESSURE: 122 MMHG

## 2024-12-31 VITALS
WEIGHT: 132.94 LBS | DIASTOLIC BLOOD PRESSURE: 82 MMHG | OXYGEN SATURATION: 98 % | HEIGHT: 64 IN | HEART RATE: 71 BPM | SYSTOLIC BLOOD PRESSURE: 129 MMHG | RESPIRATION RATE: 13 BRPM | TEMPERATURE: 98 F

## 2024-12-31 LAB
ALBUMIN SERPL ELPH-MCNC: 3.8 G/DL — SIGNIFICANT CHANGE UP (ref 3.3–5)
ALP SERPL-CCNC: 72 U/L — SIGNIFICANT CHANGE UP (ref 40–120)
ALT FLD-CCNC: 27 U/L — SIGNIFICANT CHANGE UP (ref 12–78)
ANION GAP SERPL CALC-SCNC: 4 MMOL/L — LOW (ref 5–17)
AST SERPL-CCNC: 20 U/L — SIGNIFICANT CHANGE UP (ref 15–37)
BASOPHILS # BLD AUTO: 0.03 K/UL — SIGNIFICANT CHANGE UP (ref 0–0.2)
BASOPHILS NFR BLD AUTO: 0.5 % — SIGNIFICANT CHANGE UP (ref 0–2)
BILIRUB SERPL-MCNC: 0.4 MG/DL — SIGNIFICANT CHANGE UP (ref 0.2–1.2)
BUN SERPL-MCNC: 10 MG/DL — SIGNIFICANT CHANGE UP (ref 7–23)
CALCIUM SERPL-MCNC: 9.2 MG/DL — SIGNIFICANT CHANGE UP (ref 8.5–10.1)
CHLORIDE SERPL-SCNC: 109 MMOL/L — HIGH (ref 96–108)
CO2 SERPL-SCNC: 24 MMOL/L — SIGNIFICANT CHANGE UP (ref 22–31)
CREAT SERPL-MCNC: 0.65 MG/DL — SIGNIFICANT CHANGE UP (ref 0.5–1.3)
EGFR: 118 ML/MIN/1.73M2 — SIGNIFICANT CHANGE UP
EOSINOPHIL # BLD AUTO: 0.08 K/UL — SIGNIFICANT CHANGE UP (ref 0–0.5)
EOSINOPHIL NFR BLD AUTO: 1.3 % — SIGNIFICANT CHANGE UP (ref 0–6)
GLUCOSE SERPL-MCNC: 97 MG/DL — SIGNIFICANT CHANGE UP (ref 70–99)
HCT VFR BLD CALC: 40.4 % — SIGNIFICANT CHANGE UP (ref 34.5–45)
HGB BLD-MCNC: 13.7 G/DL — SIGNIFICANT CHANGE UP (ref 11.5–15.5)
IMM GRANULOCYTES NFR BLD AUTO: 0.3 % — SIGNIFICANT CHANGE UP (ref 0–0.9)
LYMPHOCYTES # BLD AUTO: 1.31 K/UL — SIGNIFICANT CHANGE UP (ref 1–3.3)
LYMPHOCYTES # BLD AUTO: 21.8 % — SIGNIFICANT CHANGE UP (ref 13–44)
MCHC RBC-ENTMCNC: 30.3 PG — SIGNIFICANT CHANGE UP (ref 27–34)
MCHC RBC-ENTMCNC: 33.9 G/DL — SIGNIFICANT CHANGE UP (ref 32–36)
MCV RBC AUTO: 89.4 FL — SIGNIFICANT CHANGE UP (ref 80–100)
MONOCYTES # BLD AUTO: 0.39 K/UL — SIGNIFICANT CHANGE UP (ref 0–0.9)
MONOCYTES NFR BLD AUTO: 6.5 % — SIGNIFICANT CHANGE UP (ref 2–14)
NEUTROPHILS # BLD AUTO: 4.18 K/UL — SIGNIFICANT CHANGE UP (ref 1.8–7.4)
NEUTROPHILS NFR BLD AUTO: 69.6 % — SIGNIFICANT CHANGE UP (ref 43–77)
NRBC # BLD: 0 /100 WBCS — SIGNIFICANT CHANGE UP (ref 0–0)
PLATELET # BLD AUTO: 310 K/UL — SIGNIFICANT CHANGE UP (ref 150–400)
POTASSIUM SERPL-MCNC: 4.6 MMOL/L — SIGNIFICANT CHANGE UP (ref 3.5–5.3)
POTASSIUM SERPL-SCNC: 4.6 MMOL/L — SIGNIFICANT CHANGE UP (ref 3.5–5.3)
PROT SERPL-MCNC: 7.6 G/DL — SIGNIFICANT CHANGE UP (ref 6–8.3)
RBC # BLD: 4.52 M/UL — SIGNIFICANT CHANGE UP (ref 3.8–5.2)
RBC # FLD: 13.6 % — SIGNIFICANT CHANGE UP (ref 10.3–14.5)
SODIUM SERPL-SCNC: 137 MMOL/L — SIGNIFICANT CHANGE UP (ref 135–145)
WBC # BLD: 6.01 K/UL — SIGNIFICANT CHANGE UP (ref 3.8–10.5)
WBC # FLD AUTO: 6.01 K/UL — SIGNIFICANT CHANGE UP (ref 3.8–10.5)

## 2024-12-31 PROCEDURE — 96375 TX/PRO/DX INJ NEW DRUG ADDON: CPT

## 2024-12-31 PROCEDURE — 85025 COMPLETE CBC W/AUTO DIFF WBC: CPT

## 2024-12-31 PROCEDURE — 99284 EMERGENCY DEPT VISIT MOD MDM: CPT

## 2024-12-31 PROCEDURE — 80053 COMPREHEN METABOLIC PANEL: CPT

## 2024-12-31 PROCEDURE — 36415 COLL VENOUS BLD VENIPUNCTURE: CPT

## 2024-12-31 PROCEDURE — 96374 THER/PROPH/DIAG INJ IV PUSH: CPT

## 2024-12-31 PROCEDURE — 99284 EMERGENCY DEPT VISIT MOD MDM: CPT | Mod: 25

## 2024-12-31 RX ORDER — SODIUM CHLORIDE 9 MG/ML
1000 INJECTION, SOLUTION INTRAMUSCULAR; INTRAVENOUS; SUBCUTANEOUS ONCE
Refills: 0 | Status: COMPLETED | OUTPATIENT
Start: 2024-12-31 | End: 2024-12-31

## 2024-12-31 RX ORDER — KETOROLAC TROMETHAMINE 30 MG/ML
15 INJECTION INTRAMUSCULAR; INTRAVENOUS ONCE
Refills: 0 | Status: DISCONTINUED | OUTPATIENT
Start: 2024-12-31 | End: 2024-12-31

## 2024-12-31 RX ORDER — ACETAMINOPHEN 500MG 500 MG/1
1000 TABLET, COATED ORAL ONCE
Refills: 0 | Status: COMPLETED | OUTPATIENT
Start: 2024-12-31 | End: 2024-12-31

## 2024-12-31 RX ORDER — DIAZEPAM 10 MG/1
1 TABLET ORAL
Qty: 8 | Refills: 0
Start: 2024-12-31 | End: 2025-01-03

## 2024-12-31 RX ADMIN — KETOROLAC TROMETHAMINE 15 MILLIGRAM(S): 30 INJECTION INTRAMUSCULAR; INTRAVENOUS at 13:10

## 2024-12-31 RX ADMIN — SODIUM CHLORIDE 1000 MILLILITER(S): 9 INJECTION, SOLUTION INTRAMUSCULAR; INTRAVENOUS; SUBCUTANEOUS at 13:10

## 2024-12-31 RX ADMIN — KETOROLAC TROMETHAMINE 15 MILLIGRAM(S): 30 INJECTION INTRAMUSCULAR; INTRAVENOUS at 13:40

## 2024-12-31 RX ADMIN — ACETAMINOPHEN 500MG 400 MILLIGRAM(S): 500 TABLET, COATED ORAL at 13:10

## 2024-12-31 RX ADMIN — ACETAMINOPHEN 500MG 1000 MILLIGRAM(S): 500 TABLET, COATED ORAL at 01:34

## 2024-12-31 NOTE — ED PROVIDER NOTE - CLINICAL SUMMARY MEDICAL DECISION MAKING FREE TEXT BOX
[facility]    34-year-old female chief complaint of neck pain.  Patient has been having the pain for the past couple days.  No fever associated with no cough.  Patient states that she has been went to urgent care and was sent in for further evaluation.  Patient appears well pain localized to the left side of the neck.    GENERAL: Awake, alert, NAD  HEENT: Tenderness along the left side of the neck no erythema  able to range the neck   LUNGS non labored breathing   CARDIAC: RRR, no m/r/g  ABDOMEN: Soft, , non tender, non distended, no rebound, no guarding  BACK: No midline spinal tenderness, no CVA tenderness  EXT: No edema, no calf tenderness  NEURO: A&Ox3. Moving all extremities.  SKIN: Warm and dry. No rash.  PSYCH: Normal affect.    given hx and pe will give pain control pt has ortho appt moday

## 2024-12-31 NOTE — ED PROVIDER NOTE - INTERNATIONAL TRAVEL
Methocarbamol   Routing refill request to provider for review/approval because:  Drug not on the [facility] refill protocol     Controlled Substance Refill Request for Tramadol   Problem List Complete:    No     PROVIDER TO CONSIDER COMPLETION OF PROBLEM LIST AND OVERVIEW/CONTROLLED SUBSTANCE AGREEMENT    Last Written Prescription Date:  7/14/20  Last Fill Quantity: 60,   # refills: 0    Last Office Visit with [facility] primary care provider: 4/17/20    Future Office visit:     Controlled substance agreement:   Encounter-Level CSA:    There are no encounter-level csa.     Patient-Level CSA:    There are no patient-level csa.         Last Urine Drug Screen:   Pain Drug SCR UR W RPTD Meds   Date Value Ref Range Status   07/30/2018 FINAL  Final     Comment:     (Note)  ====================================================================  [facility] COMP DRUG ANALYSIS,UR  ====================================================================  Test                             Result       Flag       Units        Drug Present   Noroxycodone                   125                     ng/mg creat    Noroxycodone is an expected metabolite of oxycodone. Sources of    oxycodone include scheduled prescription medications.   Tramadol                       PRESENT                               O-Desmethyltramadol            PRESENT                               N-Desmethyltramadol            PRESENT                                Source of tramadol is a prescription medication.    O-desmethyltramadol and N-desmethyltramadol are expected    metabolites of tramadol.   Gabapentin                     PRESENT                               Methocarbamol                  PRESENT                               Guaifenesin                    PRESENT                                Guaifenesin may be administered as an over-the-counter or    prescription drug; it may also be present as a breakdown product    of methocarbamol.   Amitriptyline                   PRESENT                               Nortriptyline                  PRESENT                                Nortriptyline may be administered as a prescription drug; it is    also an expected metabolite of amitriptyline.   Dextrorphan/Levorphanol        PRESENT                                Dextrorphan is an expected metabolite of dextromethorphan, an    over-the-counter or prescription cough suppressant. Levorphanol    is a scheduled prescription medication. Dextrorphan cannot be    distinguished from levorphanol by the method used for analysis.  ====================================================================  Test                      Result    Flag   Units      Ref Range        Creatinine              44               mg/dL      >=20            ====================================================================  Declared Medications:  Medication list was not provided.  ====================================================================  For clinical consultation, please call [phone].  ====================================================================  Analysis performed by [employer] [place] [address]     , No results found for: COMDAT, No results found for: THC13, PCP13, COC13, MAMP13, OPI13, AMP13, BZO13, TCA13, MTD13, BAR13, OXY13, PPX13, BUP13      [email or web address]       checked in past 3 months?  Yes 7/14/20           No

## 2024-12-31 NOTE — ED PROVIDER NOTE - PATIENT PORTAL LINK FT
You can access the FollowMyHealth Patient Portal offered by [facility] by registering at the following website: [email or web address]. By joining [employer]’s FollowMyHealth portal, you will also be able to view your health information using other applications (apps) compatible with our system.

## 2024-12-31 NOTE — ED PROVIDER NOTE - NSFOLLOWUPINSTRUCTIONS_ED_ALL_ED_FT
take valium for severe pain,    You can take Tylenol and Motrin every 8 hours for pain as needed.       What do I need to know about acute neck pain? Acute neck pain starts suddenly, increases quickly, and goes away in a few days. The pain may come and go, or be worse with certain movements. The pain may be only in your neck, or it may move to your arms, back, or shoulders. You may also have pain that starts in another body area and moves to your neck.  Vertebral Column    What causes or increases my risk for acute neck pain? Acute neck pain is often caused by a muscle strain or ligament sprain. Any of the following can increase your risk for acute neck pain:    Inflammation of discs in your neck    A condition that affects neck to arm nerves, such as thoracic outlet syndrome or brachial neuritis    A trauma or injury to your neck, such as being hit from behind in a car (whiplash) or sleeping in a bad position    Shingles, or an infection such as meningitis  How is the cause of acute neck pain diagnosed? Your healthcare provider will ask about your symptoms and when they began. Tell him or her if you were recently in an accident or had another injury to your neck. He or she will examine your neck and shoulders. He or she may also have you move your head in certain ways to see if any position causes or relieves the pain.    Blood tests may be used to measure the amount of inflammation or to check for signs of an infection.    X-ray or MRI pictures may show a neck injury or medical condition. Do not enter the MRI room with anything metal. Metal can cause serious damage. Tell the healthcare provider if you have any metal in or on your body.  How is acute neck pain treated? Treatment will depend on what is causing your pain.    Medicines may be prescribed or recommended for pain. You may need medicine to treat nerve pain or to stop muscle spasms. Medicines may also be given to reduce inflammation. Your healthcare provider may inject medicine into a nerve to block pain. Over-the-counter NSAID medicine or acetaminophen may be recommended to help treat minor pain or inflammation.    Traction is used to relieve pressure from nerves. Your head is gently pulled up and away from your neck. This stretches muscles and ligaments and makes more room for the spine. Your healthcare provider will tell you the kind of traction that will help your neck pain. Do not use traction devices at home unless directed by your healthcare provider.  What can I do to manage or prevent acute neck pain?    Rest your neck as directed. Do not make sudden movements, such as turning your head quickly. Your healthcare provider may recommend you wear a cervical collar for a short time. The collar will prevent you from moving your head. This will give your neck time to heal if an injury is causing your neck pain. Ask your healthcare provider when you can return to sports or other normal daily activities.  Cervical Collars      Apply heat as directed. Heat helps relieve pain and swelling. Use a heat wrap, or soak a small towel in warm water. Wring out the extra water. Apply the heat wrap or towel for 20 minutes every hour, or as directed.    Apply ice as directed. Ice helps relieve pain and swelling, and can help prevent tissue damage. Use an ice pack, or put ice in a bag. Cover the ice pack or back with a towel before you apply it to your neck. Apply the ice pack or ice for 15 minutes every hour, or as directed. Your healthcare provider can tell you how often to apply ice.    Do neck exercises as directed. Neck exercises help strengthen the muscles and increase range of motion. Your healthcare provider will tell you which exercises are right for you. He or she may give you instructions or recommend that you work with a physical therapist. Your healthcare provider or therapist can make sure you are doing the exercises correctly.    Maintain good posture. Try to keep your head and shoulders lifted when you sit. If you work in front of a computer, make sure the monitor is at the right level. You should not need to look up down to see the screen. You should also not have to lean forward to be able to read what is on the screen. Make sure your keyboard, mouse, and other computer items are placed where you do not have to extend your shoulder to reach them. Get up often if you work in front of a computer or sit for long periods of time. Stretch or walk around to keep your neck muscles loose.  Proper Ergonomics  When should I seek immediate care?    You have an injury that causes neck pain and shooting pain down your arms or legs.    Your neck pain suddenly becomes severe.    You have neck pain along with numbness, tingling, or weakness in your arms or legs.    You have a stiff neck, a headache, and a fever.  When should I call my doctor?    You have new or worsening symptoms.    Your symptoms continue even after treatment.    You have questions or concerns about your condition or care.  CARE AGREEMENT:    You have the right to help plan your care. Learn about your health condition and how it may be treated. Discuss treatment options with your healthcare providers to decide what care you want to receive. You always have the right to refuse treatment.  What do I need to know about acute neck pain? Acute neck pain starts suddenly, increases quickly, and goes away in a few days. The pain may come and go, or be worse with certain movements. The pain may be only in your neck, or it may move to your arms, back, or shoulders. You may also have pain that starts in another body area and moves to your neck.  Vertebral Column    What causes or increases my risk for acute neck pain? Acute neck pain is often caused by a muscle strain or ligament sprain. Any of the following can increase your risk for acute neck pain:    Inflammation of discs in your neck    A condition that affects neck to arm nerves, such as thoracic outlet syndrome or brachial neuritis    A trauma or injury to your neck, such as being hit from behind in a car (whiplash) or sleeping in a bad position    Shingles, or an infection such as meningitis  How is the cause of acute neck pain diagnosed? Your healthcare provider will ask about your symptoms and when they began. Tell him or her if you were recently in an accident or had another injury to your neck. He or she will examine your neck and shoulders. He or she may also have you move your head in certain ways to see if any position causes or relieves the pain.    Blood tests may be used to measure the amount of inflammation or to check for signs of an infection.    X-ray or MRI pictures may show a neck injury or medical condition. Do not enter the MRI room with anything metal. Metal can cause serious damage. Tell the healthcare provider if you have any metal in or on your body.  How is acute neck pain treated? Treatment will depend on what is causing your pain.    Medicines may be prescribed or recommended for pain. You may need medicine to treat nerve pain or to stop muscle spasms. Medicines may also be given to reduce inflammation. Your healthcare provider may inject medicine into a nerve to block pain. Over-the-counter NSAID medicine or acetaminophen may be recommended to help treat minor pain or inflammation.    Traction is used to relieve pressure from nerves. Your head is gently pulled up and away from your neck. This stretches muscles and ligaments and makes more room for the spine. Your healthcare provider will tell you the kind of traction that will help your neck pain. Do not use traction devices at home unless directed by your healthcare provider.  What can I do to manage or prevent acute neck pain?    Rest your neck as directed. Do not make sudden movements, such as turning your head quickly. Your healthcare provider may recommend you wear a cervical collar for a short time. The collar will prevent you from moving your head. This will give your neck time to heal if an injury is causing your neck pain. Ask your healthcare provider when you can return to sports or other normal daily activities.  Cervical Collars      Apply heat as directed. Heat helps relieve pain and swelling. Use a heat wrap, or soak a small towel in warm water. Wring out the extra water. Apply the heat wrap or towel for 20 minutes every hour, or as directed.    Apply ice as directed. Ice helps relieve pain and swelling, and can help prevent tissue damage. Use an ice pack, or put ice in a bag. Cover the ice pack or back with a towel before you apply it to your neck. Apply the ice pack or ice for 15 minutes every hour, or as directed. Your healthcare provider can tell you how often to apply ice.    Do neck exercises as directed. Neck exercises help strengthen the muscles and increase range of motion. Your healthcare provider will tell you which exercises are right for you. He or she may give you instructions or recommend that you work with a physical therapist. Your healthcare provider or therapist can make sure you are doing the exercises correctly.    Maintain good posture. Try to keep your head and shoulders lifted when you sit. If you work in front of a computer, make sure the monitor is at the right level. You should not need to look up down to see the screen. You should also not have to lean forward to be able to read what is on the screen. Make sure your keyboard, mouse, and other computer items are placed where you do not have to extend your shoulder to reach them. Get up often if you work in front of a computer or sit for long periods of time. Stretch or walk around to keep your neck muscles loose.  Proper Ergonomics  When should I seek immediate care?    You have an injury that causes neck pain and shooting pain down your arms or legs.    Your neck pain suddenly becomes severe.    You have neck pain along with numbness, tingling, or weakness in your arms or legs.    You have a stiff neck, a headache, and a fever.  When should I call my doctor?    You have new or worsening symptoms.    Your symptoms continue even after treatment.    You have questions or concerns about your condition or care.  CARE AGREEMENT:    You have the right to help plan your care. Learn about your health condition and how it may be treated. Discuss treatment options with your healthcare providers to decide what care you want to receive. You always have the right to refuse treatment.

## 2024-12-31 NOTE — ED ADULT NURSE NOTE - NSFALLUNIVINTERV_ED_ALL_ED
Bed/Stretcher in lowest position, wheels locked, appropriate side rails in place/Call bell, personal items and telephone in reach/Instruct patient to call for assistance before getting out of bed/chair/stretcher/Non-slip footwear applied when patient is off stretcher/[place] to call system/Physically safe environment - no spills, clutter or unnecessary equipment/Purposeful proactive rounding/Room/bathroom lighting operational, light cord in reach

## 2024-12-31 NOTE — ED ADULT TRIAGE NOTE - CHIEF COMPLAINT QUOTE
woke up with neck pain Thursday slept wrong, spasms began Saturday, went to Urgent care today for swollen scalp on left side further evaluation . visible knot/ raised area behind left ear

## 2024-12-31 NOTE — ED ADULT NURSE NOTE - OBJECTIVE STATEMENT
pt presents for left sided neck pain woke from sleep. no known injury. pt reports spasms started on left side with notable swellingto left side SCM are and left occipital lobe. pt denies visual changes, nasuea. pt c/o ringing in ear when the pain started. pt able to move head In all directions

## 2025-01-01 PROBLEM — Z78.9 OTHER SPECIFIED HEALTH STATUS: Chronic | Status: ACTIVE | Noted: 2024-01-25

## 2025-01-02 PROBLEM — Z00.00 ENCOUNTER FOR PREVENTIVE HEALTH EXAMINATION: Status: ACTIVE | Noted: 2025-01-02

## 2025-01-06 ENCOUNTER — APPOINTMENT (OUTPATIENT)
Dept: ORTHOPEDIC SURGERY | Facility: CLINIC | Age: 35
End: 2025-01-06
Payer: COMMERCIAL

## 2025-01-06 VITALS — BODY MASS INDEX: 23.05 KG/M2 | WEIGHT: 135 LBS | HEIGHT: 64 IN

## 2025-01-06 DIAGNOSIS — M54.12 RADICULOPATHY, CERVICAL REGION: ICD-10-CM

## 2025-01-06 PROCEDURE — 72050 X-RAY EXAM NECK SPINE 4/5VWS: CPT

## 2025-01-06 PROCEDURE — 99204 OFFICE O/P NEW MOD 45 MIN: CPT | Mod: 25

## 2025-01-06 RX ORDER — METHYLPREDNISOLONE 4 MG/1
4 TABLET ORAL
Qty: 1 | Refills: 0 | Status: ACTIVE | COMMUNITY
Start: 2025-01-06 | End: 1900-01-01

## 2025-01-15 ENCOUNTER — APPOINTMENT (OUTPATIENT)
Dept: ORTHOPEDIC SURGERY | Facility: CLINIC | Age: 35
End: 2025-01-15

## 2025-01-17 ENCOUNTER — APPOINTMENT (OUTPATIENT)
Dept: MRI IMAGING | Facility: CLINIC | Age: 35
End: 2025-01-17
Payer: COMMERCIAL

## 2025-01-17 PROCEDURE — 72141 MRI NECK SPINE W/O DYE: CPT

## 2025-01-29 ENCOUNTER — APPOINTMENT (OUTPATIENT)
Dept: ORTHOPEDIC SURGERY | Facility: CLINIC | Age: 35
End: 2025-01-29
Payer: COMMERCIAL

## 2025-01-29 VITALS — BODY MASS INDEX: 23.05 KG/M2 | HEIGHT: 64 IN | WEIGHT: 135 LBS

## 2025-01-29 DIAGNOSIS — M54.12 RADICULOPATHY, CERVICAL REGION: ICD-10-CM

## 2025-01-29 PROCEDURE — 99213 OFFICE O/P EST LOW 20 MIN: CPT

## 2025-02-28 ENCOUNTER — APPOINTMENT (OUTPATIENT)
Dept: PAIN MANAGEMENT | Facility: CLINIC | Age: 35
End: 2025-02-28

## 2025-02-28 VITALS — HEIGHT: 64 IN | BODY MASS INDEX: 23.39 KG/M2 | WEIGHT: 137 LBS

## 2025-02-28 DIAGNOSIS — M54.2 CERVICALGIA: ICD-10-CM

## 2025-02-28 DIAGNOSIS — M79.18 MYALGIA, OTHER SITE: ICD-10-CM

## 2025-02-28 PROCEDURE — 99203 OFFICE O/P NEW LOW 30 MIN: CPT

## 2025-03-26 ENCOUNTER — APPOINTMENT (OUTPATIENT)
Dept: ORTHOPEDIC SURGERY | Facility: CLINIC | Age: 35
End: 2025-03-26